# Patient Record
Sex: MALE | Race: WHITE | Employment: OTHER | URBAN - METROPOLITAN AREA
[De-identification: names, ages, dates, MRNs, and addresses within clinical notes are randomized per-mention and may not be internally consistent; named-entity substitution may affect disease eponyms.]

---

## 2017-01-04 ENCOUNTER — TELEPHONE (OUTPATIENT)
Dept: INTERNAL MEDICINE | Age: 67
End: 2017-01-04

## 2017-01-04 DIAGNOSIS — R19.5 HEME POSITIVE STOOL: Primary | ICD-10-CM

## 2017-01-05 ENCOUNTER — TELEPHONE (OUTPATIENT)
Dept: INTERNAL MEDICINE | Age: 67
End: 2017-01-05

## 2017-01-05 DIAGNOSIS — Z12.11 ENCOUNTER FOR SCREENING COLONOSCOPY: Primary | ICD-10-CM

## 2017-01-17 ENCOUNTER — TELEPHONE (OUTPATIENT)
Dept: INTERNAL MEDICINE | Age: 67
End: 2017-01-17

## 2017-01-17 DIAGNOSIS — J30.89 PERENNIAL ALLERGIC RHINITIS, UNSPECIFIED ALLERGIC RHINITIS TRIGGER: Chronic | ICD-10-CM

## 2017-01-17 RX ORDER — MONTELUKAST SODIUM 10 MG/1
10 TABLET ORAL NIGHTLY
Qty: 90 TABLET | Refills: 2 | Status: SHIPPED | OUTPATIENT
Start: 2017-01-17 | End: 2017-10-03 | Stop reason: SDUPTHER

## 2017-01-24 PROBLEM — Z83.2: Status: RESOLVED | Noted: 2017-01-24 | Resolved: 2017-01-24

## 2017-01-24 PROBLEM — Z83.2: Status: ACTIVE | Noted: 2017-01-24

## 2017-01-24 PROBLEM — D75.1 POLYCYTHEMIA: Status: ACTIVE | Noted: 2017-01-24

## 2017-02-13 ENCOUNTER — HOSPITAL ENCOUNTER (OUTPATIENT)
Dept: ENDOSCOPY | Age: 67
Discharge: HOME | End: 2017-02-13
Attending: INTERNAL MEDICINE | Admitting: INTERNAL MEDICINE

## 2017-02-13 VITALS
SYSTOLIC BLOOD PRESSURE: 183 MMHG | HEIGHT: 70 IN | RESPIRATION RATE: 16 BRPM | WEIGHT: 243 LBS | HEART RATE: 92 BPM | BODY MASS INDEX: 34.79 KG/M2 | DIASTOLIC BLOOD PRESSURE: 101 MMHG | TEMPERATURE: 98 F | OXYGEN SATURATION: 98 %

## 2017-02-13 RX ORDER — SODIUM CHLORIDE 0.9 % (FLUSH) 0.9 %
10 SYRINGE (ML) INJECTION PRN
Status: DISCONTINUED | OUTPATIENT
Start: 2017-02-13 | End: 2017-02-14 | Stop reason: HOSPADM

## 2017-02-13 RX ORDER — SODIUM CHLORIDE, SODIUM LACTATE, POTASSIUM CHLORIDE, CALCIUM CHLORIDE 600; 310; 30; 20 MG/100ML; MG/100ML; MG/100ML; MG/100ML
INJECTION, SOLUTION INTRAVENOUS CONTINUOUS
Status: DISCONTINUED | OUTPATIENT
Start: 2017-02-13 | End: 2017-02-14 | Stop reason: HOSPADM

## 2017-02-13 RX ORDER — SODIUM CHLORIDE 0.9 % (FLUSH) 0.9 %
10 SYRINGE (ML) INJECTION EVERY 12 HOURS SCHEDULED
Status: DISCONTINUED | OUTPATIENT
Start: 2017-02-13 | End: 2017-02-14 | Stop reason: HOSPADM

## 2017-02-13 ASSESSMENT — ENCOUNTER SYMPTOMS: SHORTNESS OF BREATH: 1

## 2017-03-31 DIAGNOSIS — E34.9 TESTOSTERONE DEFICIENCY: Chronic | ICD-10-CM

## 2017-04-05 RX ORDER — TESTOSTERONE CYPIONATE 200 MG/ML
INJECTION INTRAMUSCULAR
Qty: 10 ML | Refills: 1 | OUTPATIENT
Start: 2017-04-05 | End: 2017-10-23 | Stop reason: SDUPTHER

## 2017-04-17 ENCOUNTER — OFFICE VISIT (OUTPATIENT)
Dept: DERMATOLOGY | Age: 67
End: 2017-04-17

## 2017-04-17 DIAGNOSIS — L57.0 AK (ACTINIC KERATOSIS): Primary | ICD-10-CM

## 2017-04-17 DIAGNOSIS — L82.1 SK (SEBORRHEIC KERATOSIS): ICD-10-CM

## 2017-04-17 PROCEDURE — 4040F PNEUMOC VAC/ADMIN/RCVD: CPT | Performed by: DERMATOLOGY

## 2017-04-17 PROCEDURE — 1123F ACP DISCUSS/DSCN MKR DOCD: CPT | Performed by: DERMATOLOGY

## 2017-04-17 PROCEDURE — 3017F COLORECTAL CA SCREEN DOC REV: CPT | Performed by: DERMATOLOGY

## 2017-04-17 PROCEDURE — G8427 DOCREV CUR MEDS BY ELIG CLIN: HCPCS | Performed by: DERMATOLOGY

## 2017-04-17 PROCEDURE — G8417 CALC BMI ABV UP PARAM F/U: HCPCS | Performed by: DERMATOLOGY

## 2017-04-17 PROCEDURE — 99213 OFFICE O/P EST LOW 20 MIN: CPT | Performed by: DERMATOLOGY

## 2017-04-17 PROCEDURE — 1036F TOBACCO NON-USER: CPT | Performed by: DERMATOLOGY

## 2017-04-17 PROCEDURE — G8598 ASA/ANTIPLAT THER USED: HCPCS | Performed by: DERMATOLOGY

## 2017-06-22 ENCOUNTER — OFFICE VISIT (OUTPATIENT)
Dept: INTERNAL MEDICINE | Age: 67
End: 2017-06-22

## 2017-06-22 VITALS
HEART RATE: 76 BPM | SYSTOLIC BLOOD PRESSURE: 140 MMHG | BODY MASS INDEX: 35.22 KG/M2 | OXYGEN SATURATION: 99 % | DIASTOLIC BLOOD PRESSURE: 78 MMHG | HEIGHT: 70 IN | WEIGHT: 246 LBS

## 2017-06-22 DIAGNOSIS — E55.9 VITAMIN D DEFICIENCY: ICD-10-CM

## 2017-06-22 DIAGNOSIS — I51.7 LEFT VENTRICULAR HYPERTROPHY: Chronic | ICD-10-CM

## 2017-06-22 DIAGNOSIS — M54.16 LUMBAR RADICULOPATHY: Chronic | ICD-10-CM

## 2017-06-22 DIAGNOSIS — G89.29 CHRONIC LEFT-SIDED LOW BACK PAIN WITH LEFT-SIDED SCIATICA: Chronic | ICD-10-CM

## 2017-06-22 DIAGNOSIS — I10 ESSENTIAL HYPERTENSION: Chronic | ICD-10-CM

## 2017-06-22 DIAGNOSIS — Z12.5 SPECIAL SCREENING FOR MALIGNANT NEOPLASM OF PROSTATE: ICD-10-CM

## 2017-06-22 DIAGNOSIS — K21.9 GASTROESOPHAGEAL REFLUX DISEASE WITHOUT ESOPHAGITIS: Chronic | ICD-10-CM

## 2017-06-22 DIAGNOSIS — M54.42 CHRONIC LEFT-SIDED LOW BACK PAIN WITH LEFT-SIDED SCIATICA: Chronic | ICD-10-CM

## 2017-06-22 DIAGNOSIS — D75.1 POLYCYTHEMIA: Primary | ICD-10-CM

## 2017-06-22 DIAGNOSIS — M47.816 LUMBAR FACET ARTHROPATHY: Chronic | ICD-10-CM

## 2017-06-22 DIAGNOSIS — M48.061 LUMBAR SPINAL STENOSIS: Chronic | ICD-10-CM

## 2017-06-22 DIAGNOSIS — Z01.818 PREOPERATIVE EXAMINATION: ICD-10-CM

## 2017-06-22 DIAGNOSIS — M51.9 LUMBAR DISC DISEASE: Chronic | ICD-10-CM

## 2017-06-22 DIAGNOSIS — E78.5 HYPERLIPIDEMIA, UNSPECIFIED HYPERLIPIDEMIA TYPE: Chronic | ICD-10-CM

## 2017-06-22 DIAGNOSIS — M47.26 OSTEOARTHRITIS OF SPINE WITH RADICULOPATHY, LUMBAR REGION: Chronic | ICD-10-CM

## 2017-06-22 DIAGNOSIS — E34.9 TESTOSTERONE DEFICIENCY: Chronic | ICD-10-CM

## 2017-06-22 DIAGNOSIS — R73.9 HYPERGLYCEMIA: Chronic | ICD-10-CM

## 2017-06-22 LAB
ALBUMIN SERPL-MCNC: 4.4 G/DL (ref 3.4–5)
ALP BLD-CCNC: 69 U/L (ref 40–129)
ALT SERPL-CCNC: 27 U/L (ref 10–40)
ANION GAP SERPL CALCULATED.3IONS-SCNC: 14 MMOL/L (ref 3–16)
AST SERPL-CCNC: 25 U/L (ref 15–37)
BASOPHILS ABSOLUTE: 0.1 K/UL (ref 0–0.2)
BASOPHILS RELATIVE PERCENT: 1.1 %
BILIRUB SERPL-MCNC: 0.4 MG/DL (ref 0–1)
BILIRUBIN DIRECT: <0.2 MG/DL (ref 0–0.3)
BILIRUBIN, INDIRECT: NORMAL MG/DL (ref 0–1)
BUN BLDV-MCNC: 11 MG/DL (ref 7–20)
CALCIUM SERPL-MCNC: 9.4 MG/DL (ref 8.3–10.6)
CHLORIDE BLD-SCNC: 97 MMOL/L (ref 99–110)
CO2: 27 MMOL/L (ref 21–32)
CREAT SERPL-MCNC: 0.9 MG/DL (ref 0.8–1.3)
EOSINOPHILS ABSOLUTE: 0.4 K/UL (ref 0–0.6)
EOSINOPHILS RELATIVE PERCENT: 4.2 %
GFR AFRICAN AMERICAN: >60
GFR NON-AFRICAN AMERICAN: >60
GLUCOSE BLD-MCNC: 72 MG/DL (ref 70–99)
HCT VFR BLD CALC: 37.2 % (ref 40.5–52.5)
HEMOGLOBIN: 10.9 G/DL (ref 13.5–17.5)
LYMPHOCYTES ABSOLUTE: 1.8 K/UL (ref 1–5.1)
LYMPHOCYTES RELATIVE PERCENT: 21.3 %
MCH RBC QN AUTO: 22.2 PG (ref 26–34)
MCHC RBC AUTO-ENTMCNC: 29.4 G/DL (ref 31–36)
MCV RBC AUTO: 75.6 FL (ref 80–100)
MONOCYTES ABSOLUTE: 0.9 K/UL (ref 0–1.3)
MONOCYTES RELATIVE PERCENT: 11.2 %
NEUTROPHILS ABSOLUTE: 5.2 K/UL (ref 1.7–7.7)
NEUTROPHILS RELATIVE PERCENT: 62.2 %
PDW BLD-RTO: 17.6 % (ref 12.4–15.4)
PHOSPHORUS: 3.1 MG/DL (ref 2.5–4.9)
PLATELET # BLD: 256 K/UL (ref 135–450)
PMV BLD AUTO: 8.9 FL (ref 5–10.5)
POTASSIUM SERPL-SCNC: 4.8 MMOL/L (ref 3.5–5.1)
PROSTATE SPECIFIC ANTIGEN: 0.73 NG/ML (ref 0–4)
RBC # BLD: 4.91 M/UL (ref 4.2–5.9)
SODIUM BLD-SCNC: 138 MMOL/L (ref 136–145)
TOTAL PROTEIN: 7 G/DL (ref 6.4–8.2)
VITAMIN D 25-HYDROXY: 29 NG/ML
WBC # BLD: 8.4 K/UL (ref 4–11)

## 2017-06-22 PROCEDURE — 1123F ACP DISCUSS/DSCN MKR DOCD: CPT | Performed by: INTERNAL MEDICINE

## 2017-06-22 PROCEDURE — 1036F TOBACCO NON-USER: CPT | Performed by: INTERNAL MEDICINE

## 2017-06-22 PROCEDURE — 3017F COLORECTAL CA SCREEN DOC REV: CPT | Performed by: INTERNAL MEDICINE

## 2017-06-22 PROCEDURE — G8598 ASA/ANTIPLAT THER USED: HCPCS | Performed by: INTERNAL MEDICINE

## 2017-06-22 PROCEDURE — 93000 ELECTROCARDIOGRAM COMPLETE: CPT | Performed by: INTERNAL MEDICINE

## 2017-06-22 PROCEDURE — 99215 OFFICE O/P EST HI 40 MIN: CPT | Performed by: INTERNAL MEDICINE

## 2017-06-22 PROCEDURE — G8427 DOCREV CUR MEDS BY ELIG CLIN: HCPCS | Performed by: INTERNAL MEDICINE

## 2017-06-22 PROCEDURE — 4040F PNEUMOC VAC/ADMIN/RCVD: CPT | Performed by: INTERNAL MEDICINE

## 2017-06-22 PROCEDURE — G8417 CALC BMI ABV UP PARAM F/U: HCPCS | Performed by: INTERNAL MEDICINE

## 2017-06-22 RX ORDER — GABAPENTIN 300 MG/1
300 CAPSULE ORAL NIGHTLY
COMMUNITY
End: 2017-10-23 | Stop reason: SDUPTHER

## 2017-06-22 RX ORDER — DICLOFENAC SODIUM 75 MG/1
75 TABLET, DELAYED RELEASE ORAL 2 TIMES DAILY
COMMUNITY
End: 2018-05-02

## 2017-06-22 RX ORDER — DIAZEPAM 10 MG/1
TABLET ORAL
Qty: 2 TABLET | Refills: 0 | Status: SHIPPED | OUTPATIENT
Start: 2017-06-22 | End: 2019-11-04

## 2017-06-22 ASSESSMENT — ENCOUNTER SYMPTOMS
VOMITING: 0
CONSTIPATION: 0
ABDOMINAL PAIN: 0
DIARRHEA: 0
RECTAL PAIN: 0
ANAL BLEEDING: 0
BACK PAIN: 1
ABDOMINAL DISTENTION: 0
BLOOD IN STOOL: 0
RESPIRATORY NEGATIVE: 1
EYES NEGATIVE: 1
NAUSEA: 0

## 2017-06-23 LAB — TESTOSTERONE TOTAL-MALE: 629 NG/DL (ref 300–720)

## 2017-06-25 ASSESSMENT — ENCOUNTER SYMPTOMS: BLURRED VISION: 0

## 2017-07-03 ENCOUNTER — TELEPHONE (OUTPATIENT)
Dept: INTERNAL MEDICINE | Age: 67
End: 2017-07-03

## 2017-10-03 ENCOUNTER — TELEPHONE (OUTPATIENT)
Dept: INTERNAL MEDICINE | Age: 67
End: 2017-10-03

## 2017-10-03 RX ORDER — MONTELUKAST SODIUM 10 MG/1
10 TABLET ORAL NIGHTLY
Qty: 90 TABLET | Refills: 0 | Status: SHIPPED | OUTPATIENT
Start: 2017-10-03 | End: 2017-10-23 | Stop reason: SDUPTHER

## 2017-10-03 NOTE — TELEPHONE ENCOUNTER
Message given to [facility].  Pt will call pt to see if they want to schedule with another physician

## 2017-10-04 DIAGNOSIS — K21.9 GASTROESOPHAGEAL REFLUX DISEASE WITHOUT ESOPHAGITIS: Chronic | ICD-10-CM

## 2017-10-04 DIAGNOSIS — E34.9 TESTOSTERONE DEFICIENCY: Chronic | ICD-10-CM

## 2017-10-04 DIAGNOSIS — I10 ESSENTIAL HYPERTENSION: Chronic | ICD-10-CM

## 2017-10-04 RX ORDER — FLUTICASONE FUROATE AND VILANTEROL 100; 25 UG/1; UG/1
1 POWDER RESPIRATORY (INHALATION) DAILY
Qty: 30 EACH | Refills: 2 | Status: SHIPPED | OUTPATIENT
Start: 2017-10-04

## 2017-10-04 RX ORDER — SYRINGE W-NEEDLE,DISPOSAB,3 ML 25GX5/8"
SYRINGE, EMPTY DISPOSABLE MISCELLANEOUS
Qty: 100 EACH | Refills: 0 | Status: SHIPPED | OUTPATIENT
Start: 2017-10-04

## 2017-10-04 RX ORDER — OMEPRAZOLE 40 MG/1
40 CAPSULE, DELAYED RELEASE ORAL 2 TIMES DAILY
Qty: 180 CAPSULE | Refills: 0 | Status: SHIPPED | OUTPATIENT
Start: 2017-10-04 | End: 2017-10-23 | Stop reason: SDUPTHER

## 2017-10-04 RX ORDER — IRBESARTAN 75 MG/1
75 TABLET ORAL DAILY
Qty: 90 TABLET | Refills: 0 | Status: SHIPPED | OUTPATIENT
Start: 2017-10-04 | End: 2017-10-23 | Stop reason: SDUPTHER

## 2017-10-04 RX ORDER — CARVEDILOL 12.5 MG/1
12.5 TABLET ORAL 2 TIMES DAILY
Qty: 180 TABLET | Refills: 0 | Status: SHIPPED | OUTPATIENT
Start: 2017-10-04 | End: 2017-10-23 | Stop reason: SDUPTHER

## 2017-10-06 ENCOUNTER — TELEPHONE (OUTPATIENT)
Dept: INTERNAL MEDICINE | Age: 67
End: 2017-10-06

## 2017-10-19 ENCOUNTER — OFFICE VISIT (OUTPATIENT)
Dept: DERMATOLOGY | Age: 67
End: 2017-10-19

## 2017-10-19 DIAGNOSIS — L82.0 INFLAMED SEBORRHEIC KERATOSIS: ICD-10-CM

## 2017-10-19 DIAGNOSIS — L57.0 AK (ACTINIC KERATOSIS): Primary | ICD-10-CM

## 2017-10-19 DIAGNOSIS — D48.5 NEOPLASM OF UNCERTAIN BEHAVIOR OF SKIN: ICD-10-CM

## 2017-10-19 PROCEDURE — 17000 DESTRUCT PREMALG LESION: CPT | Performed by: DERMATOLOGY

## 2017-10-19 PROCEDURE — 11100 PR BIOPSY OF SKIN LESION: CPT | Performed by: DERMATOLOGY

## 2017-10-19 PROCEDURE — 1036F TOBACCO NON-USER: CPT | Performed by: DERMATOLOGY

## 2017-10-19 PROCEDURE — 17003 DESTRUCT PREMALG LES 2-14: CPT | Performed by: DERMATOLOGY

## 2017-10-19 PROCEDURE — 17110 DESTRUCTION B9 LES UP TO 14: CPT | Performed by: DERMATOLOGY

## 2017-10-19 NOTE — PROGRESS NOTES
[facility]  Caitie Lynn MD  [phone]      Marianne Pacer  1950    79 y.o. male     Date of Visit: 10/19/2017    Chief Complaint: AKs    History of Present Illness:    1. F/u for a history of AKs - complains of only few scaly lesions on the scalp and face today. 2.  He complains of intensely pruritic lesions on the flanks, right shoulder and abdomen. 3.  Unknown duration of a pigmented lesion on the left shoulder. Review of Systems:  Skin: No new or changing moles. Past Medical History, Family History, Surgical History, Medications and Allergies reviewed.     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    Dyspnea on exertion 11/8/2010    Erectile dysfunction 11/8/2010    Essential hypertension 11/4/2015    Fatty liver 11/8/2010    Gastroesophageal reflux disease with esophagitis 5/27/2016    Gastroesophageal reflux disease without esophagitis 11/30/2015    GERD (gastroesophageal reflux disease) 11/8/2010    Heme positive stool 11/8/2010    Hyperglycemia 11/8/2010    Hyperlipidemia 11/8/2010    Hypertension 11/8/2010    Left ventricular hypertrophy 11/8/2010    Lumbar disc disease 1/3/2013    Lumbar disc herniation 11/8/2010    Lumbar facet arthropathy 1/3/2013    Lumbar radiculopathy 11/8/2010    Lumbar spinal stenosis 1/3/2013    Obesity 11/8/2010    Perennial allergic rhinitis 10/17/2016    Polycythemia     Retinal detachment 9/30/2013    Right eye - 2013    Testosterone deficiency 3/7/2013     Past Surgical History:   Procedure Laterality Date    BACK SURGERY  07/01/2017    CATARACT REMOVAL WITH IMPLANT Left November 11, surgical pen. Alcohol was used to cleanse the site. Local anesthesia was acheived with 1% lidocaine with epinephrine. Shave biopsy was performed using a razor blade. Hemostasis was achieved with aluminum chloride. The wound(s) were dressed with petrolatum and covered with a bandage. Wound care instructions were reviewed. 1 Specimen (s) sent to pathology. The specimen bottles were appropriately labeled. We also reviewed the risks of bleeding, scar, and infection. Return in about 6 months (around 4/19/2018).

## 2017-10-23 ENCOUNTER — OFFICE VISIT (OUTPATIENT)
Dept: INTERNAL MEDICINE | Age: 67
End: 2017-10-23

## 2017-10-23 VITALS
BODY MASS INDEX: 34.79 KG/M2 | HEIGHT: 70 IN | OXYGEN SATURATION: 98 % | DIASTOLIC BLOOD PRESSURE: 84 MMHG | SYSTOLIC BLOOD PRESSURE: 132 MMHG | WEIGHT: 243 LBS | HEART RATE: 74 BPM

## 2017-10-23 DIAGNOSIS — K21.9 GASTROESOPHAGEAL REFLUX DISEASE WITHOUT ESOPHAGITIS: Chronic | ICD-10-CM

## 2017-10-23 DIAGNOSIS — R05.9 COUGH: ICD-10-CM

## 2017-10-23 DIAGNOSIS — E34.9 TESTOSTERONE DEFICIENCY: Chronic | ICD-10-CM

## 2017-10-23 DIAGNOSIS — R06.09 EXERTIONAL DYSPNEA: Primary | ICD-10-CM

## 2017-10-23 DIAGNOSIS — I10 ESSENTIAL HYPERTENSION: Chronic | ICD-10-CM

## 2017-10-23 DIAGNOSIS — K76.0 FATTY LIVER: Chronic | ICD-10-CM

## 2017-10-23 DIAGNOSIS — E78.5 HYPERLIPIDEMIA, UNSPECIFIED HYPERLIPIDEMIA TYPE: Chronic | ICD-10-CM

## 2017-10-23 DIAGNOSIS — M54.50 LOW BACK PAIN WITHOUT SCIATICA, UNSPECIFIED BACK PAIN LATERALITY, UNSPECIFIED CHRONICITY: ICD-10-CM

## 2017-10-23 PROCEDURE — G8484 FLU IMMUNIZE NO ADMIN: HCPCS | Performed by: INTERNAL MEDICINE

## 2017-10-23 PROCEDURE — 1123F ACP DISCUSS/DSCN MKR DOCD: CPT | Performed by: INTERNAL MEDICINE

## 2017-10-23 PROCEDURE — G8417 CALC BMI ABV UP PARAM F/U: HCPCS | Performed by: INTERNAL MEDICINE

## 2017-10-23 PROCEDURE — 1036F TOBACCO NON-USER: CPT | Performed by: INTERNAL MEDICINE

## 2017-10-23 PROCEDURE — 99214 OFFICE O/P EST MOD 30 MIN: CPT | Performed by: INTERNAL MEDICINE

## 2017-10-23 PROCEDURE — 4040F PNEUMOC VAC/ADMIN/RCVD: CPT | Performed by: INTERNAL MEDICINE

## 2017-10-23 PROCEDURE — G8598 ASA/ANTIPLAT THER USED: HCPCS | Performed by: INTERNAL MEDICINE

## 2017-10-23 PROCEDURE — 3017F COLORECTAL CA SCREEN DOC REV: CPT | Performed by: INTERNAL MEDICINE

## 2017-10-23 PROCEDURE — G8427 DOCREV CUR MEDS BY ELIG CLIN: HCPCS | Performed by: INTERNAL MEDICINE

## 2017-10-23 RX ORDER — CARVEDILOL 12.5 MG/1
12.5 TABLET ORAL 2 TIMES DAILY
Qty: 180 TABLET | Refills: 3 | Status: SHIPPED | OUTPATIENT
Start: 2017-10-23 | End: 2019-01-03 | Stop reason: SDUPTHER

## 2017-10-23 RX ORDER — METHOCARBAMOL 750 MG/1
750 TABLET, FILM COATED ORAL 2 TIMES DAILY
Qty: 60 TABLET | Refills: 2 | Status: SHIPPED | OUTPATIENT
Start: 2017-10-23 | End: 2018-06-14 | Stop reason: SDUPTHER

## 2017-10-23 RX ORDER — GABAPENTIN 300 MG/1
300 CAPSULE ORAL NIGHTLY
Qty: 90 CAPSULE | Refills: 3 | Status: SHIPPED | OUTPATIENT
Start: 2017-10-23 | End: 2018-05-02 | Stop reason: SDUPTHER

## 2017-10-23 RX ORDER — IRBESARTAN 75 MG/1
75 TABLET ORAL DAILY
Qty: 90 TABLET | Refills: 3 | Status: SHIPPED | OUTPATIENT
Start: 2017-10-23 | End: 2018-05-02 | Stop reason: SDUPTHER

## 2017-10-23 RX ORDER — OMEPRAZOLE 40 MG/1
40 CAPSULE, DELAYED RELEASE ORAL 2 TIMES DAILY
Qty: 180 CAPSULE | Refills: 3 | Status: SHIPPED | OUTPATIENT
Start: 2017-10-23

## 2017-10-23 RX ORDER — TESTOSTERONE CYPIONATE 200 MG/ML
INJECTION INTRAMUSCULAR
Qty: 6 ML | Refills: 5 | Status: SHIPPED | OUTPATIENT
Start: 2017-10-23 | End: 2018-06-14 | Stop reason: SDUPTHER

## 2017-10-23 RX ORDER — MONTELUKAST SODIUM 10 MG/1
10 TABLET ORAL NIGHTLY
Qty: 90 TABLET | Refills: 3 | Status: SHIPPED | OUTPATIENT
Start: 2017-10-23 | End: 2018-12-28 | Stop reason: SDUPTHER

## 2017-10-23 ASSESSMENT — ENCOUNTER SYMPTOMS
CHEST TIGHTNESS: 0
ABDOMINAL PAIN: 1
SORE THROAT: 0
NAUSEA: 0
BACK PAIN: 1
SHORTNESS OF BREATH: 1
VOMITING: 0

## 2017-10-23 NOTE — PROGRESS NOTES
Outpatient Note for established Patient - regular Visit    History Obtained From:  patient, electronic medical record    CHIEF COMPLAINT:    Chief Complaint   Patient presents with    Hyperlipidemia     follow up     Hypertension     follow up     Cough     slightly productive        HISTORY OF PRESENT ILLNESS:   The patient is a 79 y.o. male who is here today for f/u after back surgery 2009, 2013, 2017 with herniated disc in last time. In the last few weeks he has worsening in his chronic cough (that was diagnosed as GERD related). He treated it with Mucinex. It is worse at nights. also exertional dyspnea- EKG from 06/2017. Has has the diagnosis of asthma. He has no history of smoking. No history of loss of weight or loss of appetite. He also had angiogram in [id number]- for exertional dyspnea. no abnormality found.    He is also here for follow-up with hypertension and hyperlipidemia    Past Medical History:        Diagnosis Date    Allergic rhinitis 6/26/2012    Anxiety 11/8/2010    Cataracts, bilateral 11/8/2010    Cervical radiculopathy 11/8/2010    Chronic left-sided low back pain with left-sided sciatica 6/22/2017    Decreased hearing 11/8/2010    Detached retina 11/8/2010    DJD (degenerative joint disease) of lumbar spine 11/8/2010    Dyspnea on exertion 11/8/2010    Erectile dysfunction 11/8/2010    Essential hypertension 11/4/2015    Fatty liver 11/8/2010    Gastroesophageal reflux disease with esophagitis 5/27/2016    Gastroesophageal reflux disease without esophagitis 11/30/2015    GERD (gastroesophageal reflux disease) 11/8/2010    Heme positive stool 11/8/2010    Hyperglycemia 11/8/2010    Hyperlipidemia 11/8/2010    Hypertension 11/8/2010    Left ventricular hypertrophy 11/8/2010    Lumbar disc disease 1/3/2013    Lumbar disc herniation 11/8/2010    Lumbar facet arthropathy 1/3/2013    Lumbar radiculopathy 11/8/2010    Lumbar spinal stenosis 1/3/2013    Obesity 11/8/2010  Perennial allergic rhinitis 10/17/2016    Polycythemia     Retinal detachment 9/30/2013    Right eye - 2013    Testosterone deficiency 3/7/2013       Social History:   TOBACCO:   reports that he has never smoked. He has never used smokeless tobacco.  ETOH:   reports that he drinks alcohol. Current Medications:    Prior to Admission medications    Medication Sig Start Date End Date Taking?  Authorizing Provider   testosterone cypionate (DEPOTESTOTERONE CYPIONATE) 200 MG/ML injection Administer 1 ml (200 mg) intramuscularly every two weeks dispense multi-dose 10/23/17  Yes Chetna Maldonado MD   irbesartan (AVAPRO) 75 MG tablet Take 1 tablet by mouth daily 10/23/17  Yes Chetna Maldonado MD   montelukast (SINGULAIR) 10 MG tablet Take 1 tablet by mouth nightly 10/23/17  Yes Chetna Maldonado MD   carvedilol (COREG) 12.5 MG tablet Take 1 tablet by mouth 2 times daily 10/23/17  Yes Chetna Maldonado MD   omeprazole (PRILOSEC) 40 MG delayed release capsule Take 1 capsule by mouth 2 times daily 10/23/17  Yes Chetna Maldonado MD   gabapentin (NEURONTIN) 300 MG capsule Take 1 capsule by mouth nightly 10/23/17  Yes Chetna Maldonado MD   methocarbamol (ROBAXIN-750) 750 MG tablet Take 1 tablet by mouth 2 times daily 10/23/17  Yes Chetna Maldonado MD   fluticasone-vilanterol (BREO ELLIPTA) 100-25 MCG/INH AEPB inhaler Inhale 1 puff into the lungs daily 10/4/17  Yes Armani Arora MD   beclomethasone (QNASL) 80 MCG/ACT AERS nasal spray 1 spray by Each Nare route daily 10/4/17  Yes Armani Aroar MD   Hypodermic Needles-Disposable [facility] NEEDLE 18G) MISC Use once a month to administer testosterone 10/4/17  Yes Armani Arora MD   Syringe/Needle, Disp, (SYRINGE 3CC/22GX1\") 22G X 1\" 3 ML MISC Use once a month to administer testosterone 10/4/17  Yes Armani Arora MD   diclofenac (VOLTAREN) 75 MG EC tablet Take 75 mg by mouth 2 times daily   Yes Historical Provider, MD   diazepam (VALIUM) 10 MG tablet Use as directed -- take one the night before surgery and one on the morning of surgery 6/22/17  Yes Deja Pearce MD   docusate sodium (COLACE) 100 MG capsule Take 100 mg by mouth 2 times daily   Yes Historical Provider, MD   aspirin EC 81 MG EC tablet Take 1 tablet by mouth daily with food. 5/27/16  Yes Deja Pearce MD   Multiple Vitamin (MULTIVITAMIN) capsule Take 1 capsule by mouth daily 11/30/15  Yes Deja Pearce MD   cycloSPORINE (RESTASIS) 0.05 % ophthalmic emulsion Place 1 drop into both eyes 2 times daily. 9/30/13  Yes Deja Pearce MD       REVIEW OF SYSTEMS:  Review of Systems   Constitutional: Negative for chills and fever. HENT: Negative for ear pain and sore throat. Respiratory: Positive for shortness of breath. Negative for chest tightness. Cardiovascular: Negative for chest pain and palpitations. No PND/orthopnea/ nocurria    Gastrointestinal: Positive for abdominal pain. Negative for nausea and vomiting. He does get epigastric pain and belching. Musculoskeletal: Positive for back pain. Neurological: Negative for weakness and numbness. Physical Exam:      Vitals: /84   Pulse 74   Ht 5' 10\" (1.778 m)   Wt 243 lb (110.2 kg)   SpO2 98%   BMI 34.87 kg/m²     Body mass index is 34.87 kg/m². Wt Readings from Last 3 Encounters:   10/23/17 243 lb (110.2 kg)   06/22/17 246 lb (111.6 kg)   05/16/17 244 lb 6.4 oz (110.9 kg)     Physical Exam   Constitutional: He is oriented to person, place, and time. He appears well-developed and well-nourished. No distress. HENT:   Head: Normocephalic and atraumatic. Right Ear: External ear normal.   Left Ear: External ear normal.   Mouth/Throat: Oropharynx is clear and moist. No oropharyngeal exudate. Eyes: Conjunctivae and EOM are normal. Right eye exhibits no discharge. Left eye exhibits no discharge. No scleral icterus. Neck: Normal range of motion. Neck supple.    Cardiovascular: Normal rate, normal heart

## 2017-10-24 ENCOUNTER — TELEPHONE (OUTPATIENT)
Dept: DERMATOLOGY | Age: 67
End: 2017-10-24

## 2017-10-25 ENCOUNTER — TELEPHONE (OUTPATIENT)
Dept: INTERNAL MEDICINE | Age: 67
End: 2017-10-25

## 2017-10-25 DIAGNOSIS — R06.09 EXERTIONAL DYSPNEA: Primary | ICD-10-CM

## 2017-10-25 NOTE — TELEPHONE ENCOUNTER
Reviewed results of the biopsy with the patient. Date of biopsy: 10/19/17  Site of biopsy: L shoulder  Result: Dermatofibroma    Plan: Benign, no further treatment needed    The patient expressed understanding of the plan.

## 2017-10-26 ENCOUNTER — TELEPHONE (OUTPATIENT)
Dept: INTERNAL MEDICINE | Age: 67
End: 2017-10-26

## 2017-10-26 NOTE — TELEPHONE ENCOUNTER
Pt calling back and wanted to be informed if medical records where faxed from the Pulmonologist Dr. Km Coello and would like a call to be advised   Pt can be reached at [phone].  Pt states to please leave a message if we received or not

## 2017-10-27 ENCOUNTER — TELEPHONE (OUTPATIENT)
Dept: INTERNAL MEDICINE | Age: 67
End: 2017-10-27

## 2017-11-01 ENCOUNTER — LAB (OUTPATIENT)
Dept: LAB | Facility: HOSPITAL | Age: 67
End: 2017-11-01

## 2017-11-01 ENCOUNTER — TRANSCRIBE ORDERS (OUTPATIENT)
Dept: LAB | Facility: HOSPITAL | Age: 67
End: 2017-11-01

## 2017-11-01 DIAGNOSIS — E78.5 HYPERLIPIDEMIA, UNSPECIFIED HYPERLIPIDEMIA TYPE: ICD-10-CM

## 2017-11-01 DIAGNOSIS — I10 ESSENTIAL HYPERTENSION, MALIGNANT: ICD-10-CM

## 2017-11-01 DIAGNOSIS — E66.8 OBESITY OF ENDOCRINE ORIGIN: Primary | ICD-10-CM

## 2017-11-01 DIAGNOSIS — E66.8 OBESITY OF ENDOCRINE ORIGIN: ICD-10-CM

## 2017-11-01 LAB
ALBUMIN SERPL-MCNC: 4.6 G/DL (ref 3.2–4.8)
ALBUMIN/GLOB SERPL: 1.8 G/DL (ref 1.5–2.5)
ALP SERPL-CCNC: 78 U/L (ref 25–100)
ALT SERPL W P-5'-P-CCNC: 31 U/L (ref 7–40)
ANION GAP SERPL CALCULATED.3IONS-SCNC: 6 MMOL/L (ref 3–11)
ARTICHOKE IGE QN: 106 MG/DL (ref 0–130)
AST SERPL-CCNC: 27 U/L (ref 0–33)
BILIRUB SERPL-MCNC: 0.3 MG/DL (ref 0.3–1.2)
BUN BLD-MCNC: 12 MG/DL (ref 9–23)
BUN/CREAT SERPL: 12 (ref 7–25)
CALCIUM SPEC-SCNC: 9.4 MG/DL (ref 8.7–10.4)
CHLORIDE SERPL-SCNC: 103 MMOL/L (ref 99–109)
CHOLEST SERPL-MCNC: 161 MG/DL (ref 0–200)
CO2 SERPL-SCNC: 31 MMOL/L (ref 20–31)
CREAT BLD-MCNC: 1 MG/DL (ref 0.6–1.3)
DEPRECATED RDW RBC AUTO: 48.2 FL (ref 37–54)
ERYTHROCYTE [DISTWIDTH] IN BLOOD BY AUTOMATED COUNT: 18.3 % (ref 11.3–14.5)
GFR SERPL CREATININE-BSD FRML MDRD: 75 ML/MIN/1.73
GLOBULIN UR ELPH-MCNC: 2.6 GM/DL
GLUCOSE BLD-MCNC: 104 MG/DL (ref 70–100)
HCT VFR BLD AUTO: 42.2 % (ref 38.9–50.9)
HDLC SERPL-MCNC: 54 MG/DL (ref 40–60)
HGB BLD-MCNC: 11.9 G/DL (ref 13.1–17.5)
MCH RBC QN AUTO: 20.6 PG (ref 27–31)
MCHC RBC AUTO-ENTMCNC: 28.2 G/DL (ref 32–36)
MCV RBC AUTO: 73 FL (ref 80–99)
PLATELET # BLD AUTO: 329 10*3/MM3 (ref 150–450)
PMV BLD AUTO: 9.7 FL (ref 6–12)
POTASSIUM BLD-SCNC: 4.8 MMOL/L (ref 3.5–5.5)
PROT SERPL-MCNC: 7.2 G/DL (ref 5.7–8.2)
RBC # BLD AUTO: 5.78 10*6/MM3 (ref 4.2–5.76)
SODIUM BLD-SCNC: 140 MMOL/L (ref 132–146)
TESTOST SERPL-MCNC: 438.06 NG/DL (ref 86.98–780.1)
TRIGL SERPL-MCNC: 109 MG/DL (ref 0–150)
WBC NRBC COR # BLD: 7.33 10*3/MM3 (ref 3.5–10.8)

## 2017-11-01 PROCEDURE — 84403 ASSAY OF TOTAL TESTOSTERONE: CPT | Performed by: INTERNAL MEDICINE

## 2017-11-01 PROCEDURE — 36415 COLL VENOUS BLD VENIPUNCTURE: CPT | Performed by: INTERNAL MEDICINE

## 2017-11-01 PROCEDURE — 80061 LIPID PANEL: CPT | Performed by: INTERNAL MEDICINE

## 2017-11-01 PROCEDURE — 85027 COMPLETE CBC AUTOMATED: CPT | Performed by: INTERNAL MEDICINE

## 2017-11-01 PROCEDURE — 80053 COMPREHEN METABOLIC PANEL: CPT | Performed by: INTERNAL MEDICINE

## 2017-11-14 ENCOUNTER — HOSPITAL ENCOUNTER (OUTPATIENT)
Dept: NON INVASIVE DIAGNOSTICS | Age: 67
Discharge: HOME | End: 2017-11-14
Attending: INTERNAL MEDICINE | Admitting: INTERNAL MEDICINE

## 2017-11-14 DIAGNOSIS — R06.00 DYSPNEA: ICD-10-CM

## 2017-11-14 DIAGNOSIS — R06.09 EXERTIONAL DYSPNEA: ICD-10-CM

## 2017-11-14 LAB
LV EF: 60 %
LVEF MODALITY: NORMAL

## 2017-12-11 ENCOUNTER — TELEPHONE (OUTPATIENT)
Dept: INTERNAL MEDICINE | Age: 67
End: 2017-12-11

## 2018-01-18 ENCOUNTER — TELEPHONE (OUTPATIENT)
Dept: INTERNAL MEDICINE | Age: 68
End: 2018-01-18

## 2018-01-18 DIAGNOSIS — R05.9 COUGH: Primary | ICD-10-CM

## 2018-01-22 NOTE — TELEPHONE ENCOUNTER
Pt states that his wife has been congested and has had productive cough that started while pt was sick. He is concerned that she may get the flu too and she is scheduled for surgery next week. This has been sent to MD to advise.

## 2018-01-24 ENCOUNTER — HOSPITAL ENCOUNTER (OUTPATIENT)
Dept: OTHER | Age: 68
Discharge: HOME | End: 2018-01-24
Attending: INTERNAL MEDICINE | Admitting: INTERNAL MEDICINE

## 2018-01-24 ENCOUNTER — OFFICE VISIT (OUTPATIENT)
Dept: INTERNAL MEDICINE | Age: 68
End: 2018-01-24

## 2018-01-24 VITALS
BODY MASS INDEX: 34.87 KG/M2 | TEMPERATURE: 98.1 F | HEART RATE: 88 BPM | SYSTOLIC BLOOD PRESSURE: 152 MMHG | DIASTOLIC BLOOD PRESSURE: 90 MMHG | WEIGHT: 243 LBS | OXYGEN SATURATION: 97 %

## 2018-01-24 DIAGNOSIS — J40 BRONCHITIS: Primary | ICD-10-CM

## 2018-01-24 DIAGNOSIS — R05.9 COUGH: ICD-10-CM

## 2018-01-24 PROCEDURE — 1036F TOBACCO NON-USER: CPT | Performed by: INTERNAL MEDICINE

## 2018-01-24 PROCEDURE — 1123F ACP DISCUSS/DSCN MKR DOCD: CPT | Performed by: INTERNAL MEDICINE

## 2018-01-24 PROCEDURE — G8484 FLU IMMUNIZE NO ADMIN: HCPCS | Performed by: INTERNAL MEDICINE

## 2018-01-24 PROCEDURE — 3017F COLORECTAL CA SCREEN DOC REV: CPT | Performed by: INTERNAL MEDICINE

## 2018-01-24 PROCEDURE — G8598 ASA/ANTIPLAT THER USED: HCPCS | Performed by: INTERNAL MEDICINE

## 2018-01-24 PROCEDURE — 4040F PNEUMOC VAC/ADMIN/RCVD: CPT | Performed by: INTERNAL MEDICINE

## 2018-01-24 PROCEDURE — G8427 DOCREV CUR MEDS BY ELIG CLIN: HCPCS | Performed by: INTERNAL MEDICINE

## 2018-01-24 PROCEDURE — G8417 CALC BMI ABV UP PARAM F/U: HCPCS | Performed by: INTERNAL MEDICINE

## 2018-01-24 PROCEDURE — 99213 OFFICE O/P EST LOW 20 MIN: CPT | Performed by: INTERNAL MEDICINE

## 2018-01-24 RX ORDER — AZITHROMYCIN 250 MG/1
TABLET, FILM COATED ORAL
Qty: 1 PACKET | Refills: 0 | Status: SHIPPED | OUTPATIENT
Start: 2018-01-24 | End: 2018-02-03

## 2018-01-24 NOTE — PROGRESS NOTES
Outpatient Note for established Patient - regular Visit    History Obtained From:  patient, electronic medical record  Is the patient new to me ? - No    HISTORY OF PRESENT ILLNESS:   The patient is a 79 y.o. male who is here today for :  1) he was having cough-m productive, SOB epigastric pain , some sore throat. He went to the urgency room--> tested + with flu. On Monday he thought that he was doing better. Today he has epigastric pain, cough is worse with sputum. No fever/ chills today. Occasional diarreha. Past Medical History:        Diagnosis Date    Allergic rhinitis 6/26/2012    Anxiety 11/8/2010    Cataracts, bilateral 11/8/2010    Cervical radiculopathy 11/8/2010    Chronic left-sided low back pain with left-sided sciatica 6/22/2017    Decreased hearing 11/8/2010    Detached retina 11/8/2010    DJD (degenerative joint disease) of lumbar spine 11/8/2010    Dyspnea on exertion 11/8/2010    Erectile dysfunction 11/8/2010    Essential hypertension 11/4/2015    Fatty liver 11/8/2010    Gastroesophageal reflux disease with esophagitis 5/27/2016    Gastroesophageal reflux disease without esophagitis 11/30/2015    GERD (gastroesophageal reflux disease) 11/8/2010    Heme positive stool 11/8/2010    Hyperglycemia 11/8/2010    Hyperlipidemia 11/8/2010    Hypertension 11/8/2010    Left ventricular hypertrophy 11/8/2010    Lumbar disc disease 1/3/2013    Lumbar disc herniation 11/8/2010    Lumbar facet arthropathy 1/3/2013    Lumbar radiculopathy 11/8/2010    Lumbar spinal stenosis 1/3/2013    Obesity 11/8/2010    Perennial allergic rhinitis 10/17/2016    Polycythemia     Retinal detachment 9/30/2013    Right eye - 2013    Testosterone deficiency 3/7/2013       Social History:   TOBACCO:   reports that he has never smoked. He has never used smokeless tobacco.      ETOH:   reports that he drinks alcohol.     Current Medications:    Prior to Admission medications    Medication Sig Historical Provider, MD   aspirin EC 81 MG EC tablet Take 1 tablet by mouth daily with food. 5/27/16  Yes Luke Gale MD   Multiple Vitamin (MULTIVITAMIN) capsule Take 1 capsule by mouth daily 11/30/15  Yes Luke Gale MD   cycloSPORINE (RESTASIS) 0.05 % ophthalmic emulsion Place 1 drop into both eyes 2 times daily. 9/30/13  Yes Luke Gale MD       REVIEW OF SYSTEMS:  Review of Systems   Constitutional: Negative for chills and fever. HENT: Positive for sore throat. Respiratory: Positive for cough. Negative for chest tightness and shortness of breath. Cardiovascular: Negative for chest pain. Gastrointestinal: Positive for abdominal pain. Negative for nausea and vomiting. Physical Exam:      Vitals: BP (!) 152/90 (Site: Left Arm, Position: Sitting, Cuff Size: Medium Adult)   Pulse 88   Temp 98.1 °F (36.7 °C)   Wt 243 lb (110.2 kg)   SpO2 97%   BMI 34.87 kg/m²     Body mass index is 34.87 kg/m². Wt Readings from Last 3 Encounters:   01/24/18 243 lb (110.2 kg)   10/23/17 243 lb (110.2 kg)   06/22/17 246 lb (111.6 kg)     Physical Exam   Constitutional: He is oriented to person, place, and time. He appears well-developed and well-nourished. No distress. HENT:   Head: Normocephalic and atraumatic. Right Ear: External ear normal.   Left Ear: External ear normal.   Mouth/Throat: Oropharynx is clear and moist. No oropharyngeal exudate. Eyes: Conjunctivae and EOM are normal. Right eye exhibits no discharge. Left eye exhibits no discharge. No scleral icterus. Neck: Normal range of motion. Neck supple. Cardiovascular: Normal rate and normal heart sounds. No murmur heard. Pulmonary/Chest: Effort normal and breath sounds normal. No respiratory distress. He has no wheezes. He has no rales. Prolonged expirium     Abdominal: Soft. There is no tenderness. Lymphadenopathy:     He has no cervical adenopathy. Neurological: He is alert and oriented to person, place, and time.  He has

## 2018-01-27 ASSESSMENT — ENCOUNTER SYMPTOMS
CHEST TIGHTNESS: 0
NAUSEA: 0
COUGH: 1
SORE THROAT: 1
VOMITING: 0
SHORTNESS OF BREATH: 0
ABDOMINAL PAIN: 1

## 2018-01-30 RX ORDER — GUAIFENESIN 600 MG/1
600 TABLET, EXTENDED RELEASE ORAL 2 TIMES DAILY
Qty: 20 TABLET | Refills: 1 | Status: SHIPPED | OUTPATIENT
Start: 2018-01-30

## 2018-03-13 DIAGNOSIS — Z12.5 SCREENING PSA (PROSTATE SPECIFIC ANTIGEN): ICD-10-CM

## 2018-03-13 DIAGNOSIS — E34.9 TESTOSTERONE DEFICIENCY: Primary | Chronic | ICD-10-CM

## 2018-04-19 ENCOUNTER — OFFICE VISIT (OUTPATIENT)
Dept: DERMATOLOGY | Age: 68
End: 2018-04-19

## 2018-04-19 DIAGNOSIS — E34.9 TESTOSTERONE DEFICIENCY: Chronic | ICD-10-CM

## 2018-04-19 DIAGNOSIS — Z12.5 SCREENING PSA (PROSTATE SPECIFIC ANTIGEN): ICD-10-CM

## 2018-04-19 DIAGNOSIS — L57.0 AK (ACTINIC KERATOSIS): ICD-10-CM

## 2018-04-19 DIAGNOSIS — D48.5 NEOPLASM OF UNCERTAIN BEHAVIOR OF SKIN: ICD-10-CM

## 2018-04-19 DIAGNOSIS — L82.0 INFLAMED SEBORRHEIC KERATOSIS: Primary | ICD-10-CM

## 2018-04-19 PROCEDURE — 11101 PR BIOPSY, EACH ADDED LESION: CPT | Performed by: DERMATOLOGY

## 2018-04-19 PROCEDURE — 17003 DESTRUCT PREMALG LES 2-14: CPT | Performed by: DERMATOLOGY

## 2018-04-19 PROCEDURE — 17000 DESTRUCT PREMALG LESION: CPT | Performed by: DERMATOLOGY

## 2018-04-19 PROCEDURE — 17110 DESTRUCTION B9 LES UP TO 14: CPT | Performed by: DERMATOLOGY

## 2018-04-19 PROCEDURE — 11100 PR BIOPSY OF SKIN LESION: CPT | Performed by: DERMATOLOGY

## 2018-04-20 LAB — PROSTATE SPECIFIC ANTIGEN: 0.94 NG/ML (ref 0–4)

## 2018-04-21 LAB
SEX HORMONE BINDING GLOBULIN: 33 NMOL/L (ref 11–80)
TESTOSTERONE FREE-NONMALE: 77.5 PG/ML (ref 47–244)
TESTOSTERONE TOTAL: 383 NG/DL (ref 220–1000)

## 2018-04-25 ENCOUNTER — TELEPHONE (OUTPATIENT)
Dept: DERMATOLOGY | Age: 68
End: 2018-04-25

## 2018-04-25 ENCOUNTER — TELEPHONE (OUTPATIENT)
Dept: INTERNAL MEDICINE | Age: 68
End: 2018-04-25

## 2018-05-02 ENCOUNTER — OFFICE VISIT (OUTPATIENT)
Dept: DERMATOLOGY | Age: 68
End: 2018-05-02

## 2018-05-02 ENCOUNTER — OFFICE VISIT (OUTPATIENT)
Dept: INTERNAL MEDICINE | Age: 68
End: 2018-05-02

## 2018-05-02 VITALS
DIASTOLIC BLOOD PRESSURE: 94 MMHG | WEIGHT: 238 LBS | HEART RATE: 74 BPM | OXYGEN SATURATION: 98 % | BODY MASS INDEX: 34.15 KG/M2 | SYSTOLIC BLOOD PRESSURE: 152 MMHG

## 2018-05-02 DIAGNOSIS — M54.50 LOW BACK PAIN WITHOUT SCIATICA, UNSPECIFIED BACK PAIN LATERALITY, UNSPECIFIED CHRONICITY: ICD-10-CM

## 2018-05-02 DIAGNOSIS — K21.9 GASTROESOPHAGEAL REFLUX DISEASE WITHOUT ESOPHAGITIS: Chronic | ICD-10-CM

## 2018-05-02 DIAGNOSIS — C44.712 BCC (BASAL CELL CARCINOMA), LEG, RIGHT: Primary | ICD-10-CM

## 2018-05-02 DIAGNOSIS — E34.9 TESTOSTERONE DEFICIENCY: Chronic | ICD-10-CM

## 2018-05-02 DIAGNOSIS — E78.5 HYPERLIPIDEMIA, UNSPECIFIED HYPERLIPIDEMIA TYPE: Chronic | ICD-10-CM

## 2018-05-02 DIAGNOSIS — I10 ESSENTIAL HYPERTENSION: Primary | Chronic | ICD-10-CM

## 2018-05-02 DIAGNOSIS — R10.13 DYSPEPSIA: ICD-10-CM

## 2018-05-02 PROCEDURE — 1123F ACP DISCUSS/DSCN MKR DOCD: CPT | Performed by: INTERNAL MEDICINE

## 2018-05-02 PROCEDURE — G8598 ASA/ANTIPLAT THER USED: HCPCS | Performed by: INTERNAL MEDICINE

## 2018-05-02 PROCEDURE — 4040F PNEUMOC VAC/ADMIN/RCVD: CPT | Performed by: INTERNAL MEDICINE

## 2018-05-02 PROCEDURE — 3017F COLORECTAL CA SCREEN DOC REV: CPT | Performed by: INTERNAL MEDICINE

## 2018-05-02 PROCEDURE — 17262 DSTRJ MAL LES T/A/L 1.1-2.0: CPT | Performed by: DERMATOLOGY

## 2018-05-02 PROCEDURE — G8417 CALC BMI ABV UP PARAM F/U: HCPCS | Performed by: INTERNAL MEDICINE

## 2018-05-02 PROCEDURE — 99214 OFFICE O/P EST MOD 30 MIN: CPT | Performed by: INTERNAL MEDICINE

## 2018-05-02 PROCEDURE — 1036F TOBACCO NON-USER: CPT | Performed by: INTERNAL MEDICINE

## 2018-05-02 PROCEDURE — G8427 DOCREV CUR MEDS BY ELIG CLIN: HCPCS | Performed by: INTERNAL MEDICINE

## 2018-05-02 RX ORDER — GABAPENTIN 300 MG/1
300 CAPSULE ORAL NIGHTLY
Qty: 90 CAPSULE | Refills: 3 | Status: SHIPPED | OUTPATIENT
Start: 2018-05-02 | End: 2018-07-11 | Stop reason: SDUPTHER

## 2018-05-02 RX ORDER — IRBESARTAN 150 MG/1
150 TABLET ORAL DAILY
Qty: 90 TABLET | Refills: 2 | Status: SHIPPED | OUTPATIENT
Start: 2018-05-02

## 2018-05-02 RX ORDER — TESTOSTERONE CYPIONATE 200 MG/ML
INJECTION INTRAMUSCULAR
Qty: 6 ML | Refills: 1 | Status: CANCELLED | OUTPATIENT
Start: 2018-05-02 | End: 2018-07-02

## 2018-05-02 ASSESSMENT — ENCOUNTER SYMPTOMS
SHORTNESS OF BREATH: 0
VOMITING: 0
CHEST TIGHTNESS: 0
NAUSEA: 0
BLOOD IN STOOL: 0
ABDOMINAL PAIN: 1
DIARRHEA: 0

## 2018-05-02 ASSESSMENT — PATIENT HEALTH QUESTIONNAIRE - PHQ9
1. LITTLE INTEREST OR PLEASURE IN DOING THINGS: 0
SUM OF ALL RESPONSES TO PHQ9 QUESTIONS 1 & 2: 0
SUM OF ALL RESPONSES TO PHQ QUESTIONS 1-9: 0
2. FEELING DOWN, DEPRESSED OR HOPELESS: 0

## 2018-05-05 LAB — H PYLORI ANTIGEN STOOL: NEGATIVE

## 2018-05-06 DIAGNOSIS — R10.13 EPIGASTRIC PAIN: Primary | ICD-10-CM

## 2018-05-10 ENCOUNTER — HOSPITAL ENCOUNTER (OUTPATIENT)
Dept: ULTRASOUND IMAGING | Age: 68
Discharge: HOME | End: 2018-05-10
Attending: INTERNAL MEDICINE | Admitting: INTERNAL MEDICINE

## 2018-05-10 DIAGNOSIS — R10.13 EPIGASTRIC PAIN: ICD-10-CM

## 2018-05-10 DIAGNOSIS — L03.119 CELLULITIS AND ABSCESS OF LEG: ICD-10-CM

## 2018-05-10 DIAGNOSIS — L02.419 CELLULITIS AND ABSCESS OF LEG: ICD-10-CM

## 2018-05-29 ENCOUNTER — TELEPHONE (OUTPATIENT)
Dept: INTERNAL MEDICINE | Age: 68
End: 2018-05-29

## 2018-05-29 DIAGNOSIS — R10.9 STOMACH PAIN: Primary | ICD-10-CM

## 2018-06-06 PROBLEM — R10.9 STOMACH PAIN: Status: ACTIVE | Noted: 2018-06-06

## 2018-06-13 ENCOUNTER — TELEPHONE (OUTPATIENT)
Dept: INTERNAL MEDICINE | Age: 68
End: 2018-06-13

## 2018-06-14 ENCOUNTER — TELEPHONE (OUTPATIENT)
Dept: INTERNAL MEDICINE | Age: 68
End: 2018-06-14

## 2018-06-14 DIAGNOSIS — E34.9 TESTOSTERONE DEFICIENCY: Chronic | ICD-10-CM

## 2018-06-14 DIAGNOSIS — M54.50 LOW BACK PAIN WITHOUT SCIATICA, UNSPECIFIED BACK PAIN LATERALITY, UNSPECIFIED CHRONICITY: ICD-10-CM

## 2018-06-14 RX ORDER — METHOCARBAMOL 750 MG/1
750 TABLET, FILM COATED ORAL 2 TIMES DAILY
Qty: 180 TABLET | Refills: 3 | Status: SHIPPED | OUTPATIENT
Start: 2018-06-14

## 2018-06-14 RX ORDER — TESTOSTERONE CYPIONATE 200 MG/ML
INJECTION INTRAMUSCULAR
Qty: 6 VIAL | Refills: 1 | Status: SHIPPED | OUTPATIENT
Start: 2018-06-14 | End: 2018-06-15 | Stop reason: SDUPTHER

## 2018-06-15 ENCOUNTER — TELEPHONE (OUTPATIENT)
Dept: INTERNAL MEDICINE | Age: 68
End: 2018-06-15

## 2018-06-15 DIAGNOSIS — E34.9 TESTOSTERONE DEFICIENCY: Chronic | ICD-10-CM

## 2018-06-15 RX ORDER — TESTOSTERONE CYPIONATE 200 MG/ML
INJECTION, SOLUTION INTRAMUSCULAR
Qty: 6 VIAL | Refills: 1 | OUTPATIENT
Start: 2018-06-15 | End: 2018-11-16 | Stop reason: SDUPTHER

## 2018-07-11 ENCOUNTER — OFFICE VISIT (OUTPATIENT)
Dept: INTERNAL MEDICINE | Age: 68
End: 2018-07-11

## 2018-07-11 VITALS
OXYGEN SATURATION: 98 % | DIASTOLIC BLOOD PRESSURE: 78 MMHG | HEART RATE: 68 BPM | SYSTOLIC BLOOD PRESSURE: 132 MMHG | WEIGHT: 232 LBS | BODY MASS INDEX: 33.29 KG/M2

## 2018-07-11 DIAGNOSIS — D50.9 MICROCYTIC ANEMIA: ICD-10-CM

## 2018-07-11 DIAGNOSIS — M54.50 LOW BACK PAIN WITHOUT SCIATICA, UNSPECIFIED BACK PAIN LATERALITY, UNSPECIFIED CHRONICITY: ICD-10-CM

## 2018-07-11 DIAGNOSIS — R10.13 DYSPEPSIA: ICD-10-CM

## 2018-07-11 DIAGNOSIS — I10 ESSENTIAL HYPERTENSION: Primary | Chronic | ICD-10-CM

## 2018-07-11 DIAGNOSIS — R05.9 COUGH: ICD-10-CM

## 2018-07-11 DIAGNOSIS — Z13.1 SCREENING FOR DIABETES MELLITUS: ICD-10-CM

## 2018-07-11 DIAGNOSIS — R73.01 IFG (IMPAIRED FASTING GLUCOSE): ICD-10-CM

## 2018-07-11 DIAGNOSIS — R63.4 UNINTENTIONAL WEIGHT LOSS OF LESS THAN 10% BODY WEIGHT WITHIN 6 MONTHS: ICD-10-CM

## 2018-07-11 PROCEDURE — 1123F ACP DISCUSS/DSCN MKR DOCD: CPT | Performed by: INTERNAL MEDICINE

## 2018-07-11 PROCEDURE — 4040F PNEUMOC VAC/ADMIN/RCVD: CPT | Performed by: INTERNAL MEDICINE

## 2018-07-11 PROCEDURE — G8598 ASA/ANTIPLAT THER USED: HCPCS | Performed by: INTERNAL MEDICINE

## 2018-07-11 PROCEDURE — 99214 OFFICE O/P EST MOD 30 MIN: CPT | Performed by: INTERNAL MEDICINE

## 2018-07-11 PROCEDURE — 3017F COLORECTAL CA SCREEN DOC REV: CPT | Performed by: INTERNAL MEDICINE

## 2018-07-11 PROCEDURE — G8427 DOCREV CUR MEDS BY ELIG CLIN: HCPCS | Performed by: INTERNAL MEDICINE

## 2018-07-11 PROCEDURE — 1101F PT FALLS ASSESS-DOCD LE1/YR: CPT | Performed by: INTERNAL MEDICINE

## 2018-07-11 PROCEDURE — 1036F TOBACCO NON-USER: CPT | Performed by: INTERNAL MEDICINE

## 2018-07-11 PROCEDURE — G8417 CALC BMI ABV UP PARAM F/U: HCPCS | Performed by: INTERNAL MEDICINE

## 2018-07-11 RX ORDER — PROMETHAZINE HYDROCHLORIDE AND CODEINE PHOSPHATE 6.25; 1 MG/5ML; MG/5ML
5 SYRUP ORAL NIGHTLY PRN
Qty: 60 ML | Refills: 0 | Status: SHIPPED | OUTPATIENT
Start: 2018-07-11 | End: 2018-08-10

## 2018-07-11 RX ORDER — GABAPENTIN 300 MG/1
300 CAPSULE ORAL NIGHTLY
Qty: 90 CAPSULE | Refills: 3 | Status: SHIPPED | OUTPATIENT
Start: 2018-07-11 | End: 2019-01-03 | Stop reason: SDUPTHER

## 2018-07-11 ASSESSMENT — ENCOUNTER SYMPTOMS
NAUSEA: 0
BLOOD IN STOOL: 0
CHEST TIGHTNESS: 0
COUGH: 1
SHORTNESS OF BREATH: 0
ABDOMINAL PAIN: 0
VOMITING: 0

## 2018-07-11 NOTE — PROGRESS NOTES
(110.2 kg)     Physical Exam   Constitutional: He is oriented to person, place, and time. He appears well-developed and well-nourished. No distress. HENT:   Head: Normocephalic and atraumatic. Mouth/Throat: Oropharynx is clear and moist. No oropharyngeal exudate. Eyes: Conjunctivae and EOM are normal. Right eye exhibits no discharge. Left eye exhibits no discharge. No scleral icterus. Neck: Normal range of motion. Neck supple. No thyromegaly present. Cardiovascular: Normal rate and normal heart sounds. No murmur heard. Pulmonary/Chest: Effort normal and breath sounds normal. No respiratory distress. He has no wheezes. He has no rales. Abdominal: Soft. He exhibits no distension. There is no hepatosplenomegaly. There is no tenderness. There is no rebound. Musculoskeletal: He exhibits no edema or deformity. Lymphadenopathy:        Head (right side): No submental, no submandibular, no tonsillar, no preauricular, no posterior auricular and no occipital adenopathy present. Head (left side): No submental, no submandibular, no tonsillar, no preauricular, no posterior auricular and no occipital adenopathy present. He has no cervical adenopathy. Right cervical: No superficial cervical and no deep cervical adenopathy present. Left cervical: No superficial cervical and no deep cervical adenopathy present. Right: No supraclavicular adenopathy present. Left: No supraclavicular adenopathy present. Neurological: He is alert and oriented to person, place, and time. He has normal reflexes. No cranial nerve deficit. He exhibits normal muscle tone. GCS eye subscore is 4. GCS verbal subscore is 5. GCS motor subscore is 6. Skin: No rash noted. He is not diaphoretic. Psychiatric: He has a normal mood and affect. His behavior is normal. Judgment and thought content normal.          Assessment/Plan:   Teresa Mclean was seen today for hypertension and cough.     Diagnoses and all orders for this visit:    Essential hypertension  Well controlled- no changes in medication today. Unintentional weight loss of less than 10% body weight within 6 months  I am not sure of weather LOW was intentional or not. His dyspepsia is better which is a good sign but he has anemia. Last phlebotomy 1 year ago so I am not sure if that can explain the anemia which is microcytic and with high RDW- likely iron def/ GIB. I asked him to schedule ASAP with Dr Emory Hull. I will do some more blood work and consider CT scan as there was some fullness in the pancrease which was unexplained. -     CBC Auto Differential; Future  -     Comprehensive Metabolic Panel; Future  -     LIPASE; Future    Dyspepsia  Better but as above- recommend seeing GI   -     CBC Auto Differential; Future  -     Comprehensive Metabolic Panel; Future  -     LIPASE; Future    Microcytic anemia  As above  With for repeat of blood work   -     CBC Auto Differential; Future  -     Comprehensive Metabolic Panel; Future  -     LIPASE; Future    Screening for diabetes mellitus  -     Hemoglobin A1C; Future    IFG (impaired fasting glucose)  -     Hemoglobin A1C; Future    Cough  Chronic cough  Normal CXR 1/2018   He has known reflux which may be the cause as the cough is mainly nocturnal. Less likely due to asthma as per normal lung exam.   I will consider more chest imaging according to my results with the blood work. He has no Hx of smoking which reduced significantly the odds of lung malignancy   -     promethazine-codeine (PHENERGAN WITH CODEINE) 6.25-10 MG/5ML syrup; Take 5 mLs by mouth nightly as needed for Cough for up to 30 days. .    Low back pain without sciatica, unspecified back pain laterality, unspecified chronicity  -     gabapentin (NEURONTIN) 300 MG capsule; Take 1 capsule by mouth nightly for 30 days. .    - Patient was encouraged to call the office (and ask to see me) or be seen by other provider for any worsening or lack    of improvement in his symptoms.       - Pt was asked to schedule an appointment in 3 months, and to let me know of any scheduling difficulties. Additional patients instructions (if given): There are no Patient Instructions on file for this visit.     Afshan Soria M.D.   7/11/2018, 12:59 PM

## 2018-07-12 DIAGNOSIS — R10.9 STOMACH PAIN: Primary | ICD-10-CM

## 2018-08-02 ENCOUNTER — TELEPHONE (OUTPATIENT)
Dept: INTERNAL MEDICINE | Age: 68
End: 2018-08-02

## 2018-08-02 NOTE — TELEPHONE ENCOUNTER
Pt called to inform Dr. Pete Melara that he saw Dr. Delgado Eduardo yesterday. He has ordered a CT scan. He had labs drawn for Dr. Pete Melara this morning at [facility]. He would like the results faxed to Dr. Delgado Eduardo once received.

## 2018-08-21 ENCOUNTER — HOSPITAL ENCOUNTER (OUTPATIENT)
Age: 68
Discharge: HOME | End: 2018-08-21
Payer: MEDICARE

## 2018-08-21 ENCOUNTER — HOSPITAL ENCOUNTER (OUTPATIENT)
Dept: CT IMAGING | Age: 68
Discharge: HOME | End: 2018-08-21
Payer: MEDICARE

## 2018-08-21 DIAGNOSIS — R10.13 ABDOMINAL PAIN, EPIGASTRIC: ICD-10-CM

## 2018-08-21 PROCEDURE — 74177 CT ABD & PELVIS W/CONTRAST: CPT

## 2018-08-21 PROCEDURE — 6360000004 HC RX CONTRAST MEDICATION: Performed by: INTERNAL MEDICINE

## 2018-08-21 RX ADMIN — IOPAMIDOL 75 ML: 755 INJECTION, SOLUTION INTRAVENOUS at 12:07

## 2018-08-21 RX ADMIN — IOHEXOL 50 ML: 240 INJECTION, SOLUTION INTRATHECAL; INTRAVASCULAR; INTRAVENOUS; ORAL at 12:07

## 2018-09-24 ENCOUNTER — TELEPHONE (OUTPATIENT)
Dept: INTERNAL MEDICINE CLINIC | Age: 68
End: 2018-09-24

## 2018-09-24 DIAGNOSIS — M54.50 LOW BACK PAIN WITHOUT SCIATICA, UNSPECIFIED BACK PAIN LATERALITY, UNSPECIFIED CHRONICITY: ICD-10-CM

## 2018-09-25 ENCOUNTER — TELEPHONE (OUTPATIENT)
Dept: FAMILY MEDICINE CLINIC | Age: 68
End: 2018-09-25

## 2018-09-25 ENCOUNTER — TELEPHONE (OUTPATIENT)
Dept: INTERNAL MEDICINE CLINIC | Age: 68
End: 2018-09-25

## 2018-09-25 RX ORDER — METHOCARBAMOL 500 MG/1
750 TABLET, FILM COATED ORAL 2 TIMES DAILY
Qty: 90 TABLET | Refills: 1 | Status: SHIPPED | OUTPATIENT
Start: 2018-09-25 | End: 2018-11-24

## 2018-09-25 NOTE — TELEPHONE ENCOUNTER
Pharmacy called stated they are out of 750 mg of robaxin. They do have 500 mg. Is it ok to switch? If so please send new instructions.

## 2018-09-26 ENCOUNTER — TELEPHONE (OUTPATIENT)
Dept: INTERNAL MEDICINE CLINIC | Age: 68
End: 2018-09-26

## 2018-09-26 NOTE — TELEPHONE ENCOUNTER
Pt would like his lab results from 8.2.18, labs done @ [facility]   Pt would like them mailed to him.    Pt would like results added to [email or web address] if not mailed

## 2018-09-26 NOTE — TELEPHONE ENCOUNTER
PA SUBMITTED VIA CMM FOR Methocarbamol 500MG OR TABS  Key: [id number] - Rx #: [id number]   STATUS : PENDING

## 2018-10-18 ENCOUNTER — OFFICE VISIT (OUTPATIENT)
Dept: INTERNAL MEDICINE CLINIC | Age: 68
End: 2018-10-18
Payer: MEDICARE

## 2018-10-18 ENCOUNTER — OFFICE VISIT (OUTPATIENT)
Dept: DERMATOLOGY | Age: 68
End: 2018-10-18
Payer: MEDICARE

## 2018-10-18 VITALS
OXYGEN SATURATION: 97 % | SYSTOLIC BLOOD PRESSURE: 138 MMHG | DIASTOLIC BLOOD PRESSURE: 88 MMHG | BODY MASS INDEX: 33.43 KG/M2 | HEART RATE: 79 BPM | WEIGHT: 233 LBS

## 2018-10-18 DIAGNOSIS — L57.0 AK (ACTINIC KERATOSIS): ICD-10-CM

## 2018-10-18 DIAGNOSIS — D48.5 NEOPLASM OF UNCERTAIN BEHAVIOR OF SKIN: Primary | ICD-10-CM

## 2018-10-18 DIAGNOSIS — D75.1 POLYCYTHEMIA: ICD-10-CM

## 2018-10-18 DIAGNOSIS — R09.81 NASAL CONGESTION: Primary | ICD-10-CM

## 2018-10-18 DIAGNOSIS — L97.919 ULCER OF RIGHT LOWER LEG, WITH UNSPECIFIED SEVERITY (HCC): ICD-10-CM

## 2018-10-18 DIAGNOSIS — E34.9 TESTOSTERONE DEFICIENCY: Chronic | ICD-10-CM

## 2018-10-18 DIAGNOSIS — R19.7 DIARRHEA, UNSPECIFIED TYPE: ICD-10-CM

## 2018-10-18 DIAGNOSIS — Z23 NEED FOR INFLUENZA VACCINATION: ICD-10-CM

## 2018-10-18 DIAGNOSIS — E78.5 HYPERLIPIDEMIA, UNSPECIFIED HYPERLIPIDEMIA TYPE: Chronic | ICD-10-CM

## 2018-10-18 DIAGNOSIS — R73.01 IFG (IMPAIRED FASTING GLUCOSE): ICD-10-CM

## 2018-10-18 DIAGNOSIS — L82.1 SK (SEBORRHEIC KERATOSIS): ICD-10-CM

## 2018-10-18 DIAGNOSIS — N52.9 ERECTILE DYSFUNCTION, UNSPECIFIED ERECTILE DYSFUNCTION TYPE: Chronic | ICD-10-CM

## 2018-10-18 DIAGNOSIS — I10 ESSENTIAL HYPERTENSION: Chronic | ICD-10-CM

## 2018-10-18 PROCEDURE — G8417 CALC BMI ABV UP PARAM F/U: HCPCS | Performed by: INTERNAL MEDICINE

## 2018-10-18 PROCEDURE — 3017F COLORECTAL CA SCREEN DOC REV: CPT | Performed by: INTERNAL MEDICINE

## 2018-10-18 PROCEDURE — 4040F PNEUMOC VAC/ADMIN/RCVD: CPT | Performed by: INTERNAL MEDICINE

## 2018-10-18 PROCEDURE — 11100 PR BIOPSY OF SKIN LESION: CPT | Performed by: DERMATOLOGY

## 2018-10-18 PROCEDURE — G0008 ADMIN INFLUENZA VIRUS VAC: HCPCS | Performed by: INTERNAL MEDICINE

## 2018-10-18 PROCEDURE — 17000 DESTRUCT PREMALG LESION: CPT | Performed by: DERMATOLOGY

## 2018-10-18 PROCEDURE — 99213 OFFICE O/P EST LOW 20 MIN: CPT | Performed by: DERMATOLOGY

## 2018-10-18 PROCEDURE — 17003 DESTRUCT PREMALG LES 2-14: CPT | Performed by: DERMATOLOGY

## 2018-10-18 PROCEDURE — G8427 DOCREV CUR MEDS BY ELIG CLIN: HCPCS | Performed by: INTERNAL MEDICINE

## 2018-10-18 PROCEDURE — 90662 IIV NO PRSV INCREASED AG IM: CPT | Performed by: INTERNAL MEDICINE

## 2018-10-18 PROCEDURE — G8482 FLU IMMUNIZE ORDER/ADMIN: HCPCS | Performed by: INTERNAL MEDICINE

## 2018-10-18 PROCEDURE — 1123F ACP DISCUSS/DSCN MKR DOCD: CPT | Performed by: INTERNAL MEDICINE

## 2018-10-18 PROCEDURE — G8427 DOCREV CUR MEDS BY ELIG CLIN: HCPCS | Performed by: DERMATOLOGY

## 2018-10-18 PROCEDURE — G8598 ASA/ANTIPLAT THER USED: HCPCS | Performed by: INTERNAL MEDICINE

## 2018-10-18 PROCEDURE — 4040F PNEUMOC VAC/ADMIN/RCVD: CPT | Performed by: DERMATOLOGY

## 2018-10-18 PROCEDURE — 1123F ACP DISCUSS/DSCN MKR DOCD: CPT | Performed by: DERMATOLOGY

## 2018-10-18 PROCEDURE — 1101F PT FALLS ASSESS-DOCD LE1/YR: CPT | Performed by: INTERNAL MEDICINE

## 2018-10-18 PROCEDURE — G8417 CALC BMI ABV UP PARAM F/U: HCPCS | Performed by: DERMATOLOGY

## 2018-10-18 PROCEDURE — 3017F COLORECTAL CA SCREEN DOC REV: CPT | Performed by: DERMATOLOGY

## 2018-10-18 PROCEDURE — 1101F PT FALLS ASSESS-DOCD LE1/YR: CPT | Performed by: DERMATOLOGY

## 2018-10-18 PROCEDURE — 99214 OFFICE O/P EST MOD 30 MIN: CPT | Performed by: INTERNAL MEDICINE

## 2018-10-18 PROCEDURE — G8598 ASA/ANTIPLAT THER USED: HCPCS | Performed by: DERMATOLOGY

## 2018-10-18 PROCEDURE — 1036F TOBACCO NON-USER: CPT | Performed by: DERMATOLOGY

## 2018-10-18 PROCEDURE — G8482 FLU IMMUNIZE ORDER/ADMIN: HCPCS | Performed by: DERMATOLOGY

## 2018-10-18 PROCEDURE — 1036F TOBACCO NON-USER: CPT | Performed by: INTERNAL MEDICINE

## 2018-10-18 RX ORDER — FLUTICASONE PROPIONATE 50 MCG
2 SPRAY, SUSPENSION (ML) NASAL DAILY
Qty: 1 BOTTLE | Refills: 3 | Status: SHIPPED | OUTPATIENT
Start: 2018-10-18

## 2018-10-18 ASSESSMENT — ENCOUNTER SYMPTOMS
SORE THROAT: 1
VOMITING: 0
BLOOD IN STOOL: 0
COUGH: 1
ABDOMINAL PAIN: 0
NAUSEA: 0
CHEST TIGHTNESS: 0
SHORTNESS OF BREATH: 0
DIARRHEA: 1

## 2018-10-18 NOTE — PROGRESS NOTES
90 tablet 3    carvedilol (COREG) 12.5 MG tablet Take 1 tablet by mouth 2 times daily 180 tablet 3    omeprazole (PRILOSEC) 40 MG delayed release capsule Take 1 capsule by mouth 2 times daily 180 capsule 3    fluticasone-vilanterol (BREO ELLIPTA) 100-25 MCG/INH AEPB inhaler Inhale 1 puff into the lungs daily 30 each 2    beclomethasone (QNASL) 80 MCG/ACT AERS nasal spray 1 spray by Each Nare route daily 1 Inhaler 2    Hypodermic Needles-Disposable (SAFETY-AB NEEDLE 18G) MISC Use once a month to administer testosterone 100 each 0    Syringe/Needle, Disp, (SYRINGE 3CC/22GX1\") 22G X 1\" 3 ML MISC Use once a month to administer testosterone 100 each 0    diazepam (VALIUM) 10 MG tablet Use as directed -- take one the night before surgery and one on the morning of surgery 2 tablet 0    docusate sodium (COLACE) 100 MG capsule Take 100 mg by mouth 2 times daily      aspirin EC 81 MG EC tablet Take 1 tablet by mouth daily with food.  Multiple Vitamin (MULTIVITAMIN) capsule Take 1 capsule by mouth daily      cycloSPORINE (RESTASIS) 0.05 % ophthalmic emulsion Place 1 drop into both eyes 2 times daily. Physical Examination       The following were examined and determined to be normal: Psych/Neuro, Head/face, Conjunctivae/eyelids, Gums/teeth/lips, Neck, Breast/axilla/chest, Abdomen, Back, LUE, RLE, LLE and Nails/digits. The following were examined and determined to be abnormal: Scalp/hair and RUE. Well appearing. 1.  Right distal ulnar forearm - 1.7 cm irreg shaped smooth brown patch. 2.  Crown of the scalp - 5 ill defined irreg shaped keratotic pink macules. 3.  Trunk, extremities: stuck-on appearing tan-brown verrucous papules and plaques. Assessment and Plan     1. Neoplasm of uncertain behavior of skin, right ulnar forearm - lentigo vs lentigo maligna    Discussed possible diagnosis; patient agreeable to biopsy (verbal consent obtained).     The area(s) to be biopsied

## 2018-10-18 NOTE — PROGRESS NOTES
Outpatient Note for established Patient - regular Visit    History Obtained From:  patient, electronic medical record  Is the patient new to me ? - No    HISTORY OF PRESENT ILLNESS:   The patient is a 76 y.o. male who is here today for :  Nando Bradford was seen today for hypertension and nasal congestion. Diagnoses and all orders for this visit:    Nasal congestion  No SOB/ , some cough   No fever chills. Some sore throat. He has seen      Essential hypertension  BP at home: morning -140/80, evening 120s/80  He is on Avapro , Coreg, Aspirin. He is not on a statin    Hyperlipidemia, unspecified hyperlipidemia type    Testosterone deficiency  Patient has been on testosterone for testosterone deficiency for long time    Polycythemia  He has not done phlebotomy recently   He is followed by Dr Dolores Casas     Erectile dysfunction, unspecified erectile dysfunction type    Diarrhea:   He is followed by Dr Sheldon Cruz:  CT abdomen:  1. Mild circumferential wall thickening involving nearly the entirety of the   colon, most consistent with a diffuse infectious or inflammatory colitis. There is no evidence of pneumatosis, perforation, or free air. 2. Colonic diverticulosis. 3. Bilateral fat containing inguinal hernias. Stool sample for infectious for infectious process- neg  Now scheduled for sigmoidoscopy and EGD.    diarrhea is daily, no blood he does have abdominal pain mainly epigastric and some periumbilical.     Past Medical History:        Diagnosis Date    Allergic rhinitis 6/26/2012    Anxiety 11/8/2010    Cataracts, bilateral 11/8/2010    Cervical radiculopathy 11/8/2010    Chronic left-sided low back pain with left-sided sciatica 6/22/2017    Decreased hearing 11/8/2010    Detached retina 11/8/2010    DJD (degenerative joint disease) of lumbar spine 11/8/2010    Dyspnea on exertion 11/8/2010    Erectile dysfunction 11/8/2010    Essential hypertension 11/4/2015    Fatty liver 11/8/2010    diarrhea. Negative for abdominal pain, blood in stool, nausea and vomiting. Genitourinary: Negative for hematuria. Skin: Negative for rash. Allergic/Immunologic: Negative for immunocompromised state. Neurological: Negative for dizziness and headaches. Psychiatric/Behavioral: Negative for dysphoric mood and suicidal ideas. Physical Exam:      Vitals: /88   Pulse 79   Wt 233 lb (105.7 kg)   SpO2 97%   BMI 33.43 kg/m²     Body mass index is 33.43 kg/m². Wt Readings from Last 3 Encounters:   10/18/18 233 lb (105.7 kg)   07/11/18 232 lb (105.2 kg)   05/02/18 238 lb (108 kg)     Physical Exam   Constitutional: He is oriented to person, place, and time. He appears well-developed and well-nourished. No distress. HENT:   Head: Normocephalic and atraumatic. Right Ear: External ear normal.   Left Ear: External ear normal.   Nose: Nose normal.   Mouth/Throat: Oropharynx is clear and moist. No oropharyngeal exudate. Eyes: Conjunctivae and EOM are normal. Right eye exhibits no discharge. Left eye exhibits no discharge. No scleral icterus. Neck: Normal range of motion. Neck supple. No thyromegaly present. Cardiovascular: Normal rate and normal heart sounds. No murmur heard. Pulmonary/Chest: Effort normal and breath sounds normal. No respiratory distress. He has no wheezes. He has no rales. Abdominal: Soft. He exhibits no distension. There is no tenderness. There is no guarding. Musculoskeletal: He exhibits no edema or deformity. Lymphadenopathy:        Head (right side): No submental, no submandibular, no tonsillar, no preauricular, no posterior auricular and no occipital adenopathy present. Head (left side): No submental, no submandibular, no tonsillar, no preauricular, no posterior auricular and no occipital adenopathy present. He has no cervical adenopathy. Right cervical: No superficial cervical and no deep cervical adenopathy present.        Left cervical: No

## 2018-10-22 LAB — DERMATOLOGY PATHOLOGY REPORT: NORMAL

## 2018-10-30 ENCOUNTER — TELEPHONE (OUTPATIENT)
Dept: INTERNAL MEDICINE CLINIC | Age: 68
End: 2018-10-30

## 2018-11-01 ENCOUNTER — TELEPHONE (OUTPATIENT)
Dept: DERMATOLOGY | Age: 68
End: 2018-11-01

## 2018-11-01 NOTE — TELEPHONE ENCOUNTER
Patient is calling for results from biopsy 10/18/2018. Can you please return his call at [phone]? Thank you!

## 2018-11-16 ENCOUNTER — TELEPHONE (OUTPATIENT)
Dept: INTERNAL MEDICINE CLINIC | Age: 68
End: 2018-11-16

## 2018-11-16 DIAGNOSIS — E34.9 TESTOSTERONE DEFICIENCY: Chronic | ICD-10-CM

## 2018-11-16 RX ORDER — TESTOSTERONE CYPIONATE 200 MG/ML
INJECTION INTRAMUSCULAR
Qty: 6 VIAL | Refills: 2 | Status: SHIPPED | OUTPATIENT
Start: 2018-11-16 | End: 2021-11-17

## 2018-11-16 NOTE — TELEPHONE ENCOUNTER
Pt called in wanting to be transferred to [facility]. Would not provide reason for call. Please cb at [phone].

## 2018-12-28 RX ORDER — MONTELUKAST SODIUM 10 MG/1
10 TABLET ORAL NIGHTLY
Qty: 90 TABLET | Refills: 3 | Status: SHIPPED | OUTPATIENT
Start: 2018-12-28

## 2019-01-03 DIAGNOSIS — M54.50 LOW BACK PAIN WITHOUT SCIATICA, UNSPECIFIED BACK PAIN LATERALITY, UNSPECIFIED CHRONICITY: ICD-10-CM

## 2019-01-03 DIAGNOSIS — I10 ESSENTIAL HYPERTENSION: Chronic | ICD-10-CM

## 2019-01-03 RX ORDER — GABAPENTIN 300 MG/1
300 CAPSULE ORAL NIGHTLY
Qty: 90 CAPSULE | Refills: 0 | OUTPATIENT
Start: 2019-01-03 | End: 2019-11-04

## 2019-01-03 RX ORDER — CARVEDILOL 12.5 MG/1
12.5 TABLET ORAL 2 TIMES DAILY
Qty: 180 TABLET | Refills: 3 | Status: SHIPPED | OUTPATIENT
Start: 2019-01-03

## 2019-01-07 ENCOUNTER — TELEPHONE (OUTPATIENT)
Dept: INTERNAL MEDICINE CLINIC | Age: 69
End: 2019-01-07

## 2019-04-24 ENCOUNTER — OFFICE VISIT (OUTPATIENT)
Dept: DERMATOLOGY | Age: 69
End: 2019-04-24
Payer: MEDICARE

## 2019-04-24 DIAGNOSIS — L82.1 SK (SEBORRHEIC KERATOSIS): ICD-10-CM

## 2019-04-24 DIAGNOSIS — L24.9 IRRITANT DERMATITIS: ICD-10-CM

## 2019-04-24 DIAGNOSIS — L82.0 INFLAMED SEBORRHEIC KERATOSIS: Primary | ICD-10-CM

## 2019-04-24 DIAGNOSIS — L57.0 AK (ACTINIC KERATOSIS): ICD-10-CM

## 2019-04-24 PROCEDURE — 99213 OFFICE O/P EST LOW 20 MIN: CPT | Performed by: DERMATOLOGY

## 2019-04-24 PROCEDURE — 4040F PNEUMOC VAC/ADMIN/RCVD: CPT | Performed by: DERMATOLOGY

## 2019-04-24 PROCEDURE — 17110 DESTRUCTION B9 LES UP TO 14: CPT | Performed by: DERMATOLOGY

## 2019-04-24 PROCEDURE — 3017F COLORECTAL CA SCREEN DOC REV: CPT | Performed by: DERMATOLOGY

## 2019-04-24 PROCEDURE — G8598 ASA/ANTIPLAT THER USED: HCPCS | Performed by: DERMATOLOGY

## 2019-04-24 PROCEDURE — G8417 CALC BMI ABV UP PARAM F/U: HCPCS | Performed by: DERMATOLOGY

## 2019-04-24 PROCEDURE — 1123F ACP DISCUSS/DSCN MKR DOCD: CPT | Performed by: DERMATOLOGY

## 2019-04-24 PROCEDURE — 1036F TOBACCO NON-USER: CPT | Performed by: DERMATOLOGY

## 2019-04-24 PROCEDURE — G8427 DOCREV CUR MEDS BY ELIG CLIN: HCPCS | Performed by: DERMATOLOGY

## 2019-04-24 RX ORDER — TRIAMCINOLONE ACETONIDE 1 MG/G
CREAM TOPICAL
Qty: 80 G | Refills: 0 | Status: SHIPPED | OUTPATIENT
Start: 2019-04-24 | End: 2019-08-19 | Stop reason: SDUPTHER

## 2019-04-24 NOTE — PROGRESS NOTES
[facility]  Ilda Cox MD  [phone]      Lucy Haynes  1950    76 y.o. male     Date of Visit: 4/24/2019    Chief Complaint: skin lesions    History of Present Illness:    1. He complains of several intensely pruritic lesions on the back and scalp. 2.  Follow up for AKs on the scalp - treated with cryotherapy at last visit. Denies any signs of recurrence. 3.  He complains of few asymptomatic growths on the anterior trunk. 4.  He complains of a pruritic eruption on the upper back. Derm Hx:     Superficial BCC of the right mid posterior lateral thigh-treated with curettage on 5/2/2018.       Review of Systems:  Skin: No new or changing moles. Past Medical History, Family History, Surgical History, Medications and Allergies reviewed.     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    Dyspnea on exertion 11/8/2010    Erectile dysfunction 11/8/2010    Essential hypertension 11/4/2015    Fatty liver 11/8/2010    Gastroesophageal reflux disease with esophagitis 5/27/2016    Gastroesophageal reflux disease without esophagitis 11/30/2015    GERD (gastroesophageal reflux disease) 11/8/2010    Heme positive stool 11/8/2010    Hyperglycemia 11/8/2010    Hyperlipidemia 11/8/2010    Hypertension 11/8/2010    Left ventricular hypertrophy 11/8/2010    Lumbar disc disease 1/3/2013    Lumbar disc herniation 11/8/2010    Lumbar facet arthropathy 1/3/2013    Lumbar radiculopathy 11/8/2010    Lumbar spinal stenosis 1/3/2013    Obesity 11/8/2010    Perennial allergic rhinitis 10/17/2016    Polycythemia     Retinal detachment 9/30/2013    Right eye - 2013    Testosterone deficiency 3/7/2013     Past Surgical History:   Procedure Laterality Date    BACK SURGERY  07/01/2017    CATARACT REMOVAL WITH IMPLANT Left November 11, 2008    Dr. Darci Meehan    COLONOSCOPY  December 1, 2010    Dr. Criss Saul  02/13/2017    [address]      w/graft per PT (upper front area per PT)   [facility]  August 1, 2013    Dr. Brenna Muñoz - right L3-L4 and L4-L5 - [facility]  January 14, 2010    Dr. Andrea Flores Right November 2000    Dr. Mendez Bautista - Angelo Fleischer Left March 2007    Dr. Yuliya Bales and 1980    in-grown toe nail    UPPER GASTROINTESTINAL ENDOSCOPY  August 3, 2012    Dr. Silviano Ross  May 2, 2016    Dr. Wilcox Pin VITRECTOMY Left April 30, 2008    Dr. Campbell Heredia - trans pars plana vitrectomy, fluid-air exchange cryoretinopexy       Allergies   Allergen Reactions    Clindamycin/Lincomycin Nausea Only     Outpatient Medications Marked as Taking for the 4/24/19 encounter (Office Visit) with Melissa Lynne MD   Medication Sig Dispense Refill    DICLOFENAC PO Take by mouth      triamcinolone (KENALOG) 0.1 % cream Apply to affected area twice daily for up to 2 weeks or until improved.  80 g 0    carvedilol (COREG) 12.5 MG tablet Take 1 tablet by mouth 2 times daily 180 tablet 3    montelukast (SINGULAIR) 10 MG tablet Take 1 tablet by mouth nightly 90 tablet 3    fluticasone (FLONASE) 50 MCG/ACT nasal spray 2 sprays by Nasal route daily 1 Bottle 3    methocarbamol (ROBAXIN-750) 750 MG tablet Take 1 tablet by mouth 2 times daily 180 tablet 3    fluticasone (ARNUITY ELLIPTA) 200 MCG/ACT AEPB Inhale 1 puff into the lungs daily      irbesartan (AVAPRO) 150 MG tablet Take 1 tablet by mouth daily 90 tablet 2    guaiFENesin (MUCINEX) 600 MG extended release tablet Take 1 tablet by mouth 2 times daily 20 tablet 1    omeprazole (PRILOSEC) 40 MG delayed release capsule Take 1 capsule by mouth 2 times daily 180 capsule 3    fluticasone-vilanterol (BREO ELLIPTA) 100-25 MCG/INH AEPB inhaler Inhale 1 puff into the lungs daily 30 each 2    beclomethasone (QNASL) 80 MCG/ACT AERS nasal spray 1 spray by Each Nare route daily 1 Inhaler 2    Hypodermic Needles-Disposable (SAFETY-AB NEEDLE 18G) MISC Use once a month to administer testosterone 100 each 0    Syringe/Needle, Disp, (SYRINGE 3CC/22GX1\") 22G X 1\" 3 ML MISC Use once a month to administer testosterone 100 each 0    diazepam (VALIUM) 10 MG tablet Use as directed -- take one the night before surgery and one on the morning of surgery 2 tablet 0    docusate sodium (COLACE) 100 MG capsule Take 100 mg by mouth 2 times daily      aspirin EC 81 MG EC tablet Take 1 tablet by mouth daily with food.  Multiple Vitamin (MULTIVITAMIN) capsule Take 1 capsule by mouth daily      cycloSPORINE (RESTASIS) 0.05 % ophthalmic emulsion Place 1 drop into both eyes 2 times daily. Physical Examination       The following were examined and determined to be normal: Psych/Neuro, Head/face, Conjunctivae/eyelids, Gums/teeth/lips, Neck, Abdomen, RUE and LUE. The following were examined and determined to be abnormal: Scalp/hair, Breast/axilla/chest and Back. Well appearing. 1.  Right lateral crow of the scalp - 1, back - 5, left lower chest - 1: stuck on appearing verrucous pink brown papules and plaques. 2.  Clear. 3.  Anterior trunk - stuck-on appearing tan-brown verrucous papules and plaques. 4.  Upper back - few ill defined scaly pink macules and patches. Assessment and Plan     1.  Inflamed seborrheic keratosis     2 cycles of liquid nitrogen applied to 7 AKs: Right lateral crow of the scalp - 1, back - 5, left lower chest - 1. Patient was educated regarding the potential risks of blister formation, discomfort, hypopigmentation, and scar. Wound care was discussed. 2. AK (actinic keratosis) - clear after cryotherapy    Continue sun protective behaviors. 3. SK (seborrheic keratosis)     Reassurance. 4. Irritant dermatitis - mild    Triamcinolone 0.1% cream twice daily for up to 2 weeks or until improved. Return in about 6 months (around 10/24/2019).

## 2019-08-19 RX ORDER — TRIAMCINOLONE ACETONIDE 1 MG/G
CREAM TOPICAL
Qty: 80 G | Refills: 1 | Status: SHIPPED | OUTPATIENT
Start: 2019-08-19 | End: 2020-04-21 | Stop reason: SDUPTHER

## 2019-10-22 ENCOUNTER — TELEPHONE (OUTPATIENT)
Dept: DERMATOLOGY | Age: 69
End: 2019-10-22

## 2019-11-04 ENCOUNTER — OFFICE VISIT (OUTPATIENT)
Dept: DERMATOLOGY | Age: 69
End: 2019-11-04
Payer: MEDICARE

## 2019-11-04 DIAGNOSIS — L82.0 INFLAMED SEBORRHEIC KERATOSIS: Primary | ICD-10-CM

## 2019-11-04 DIAGNOSIS — L85.3 XEROSIS CUTIS: ICD-10-CM

## 2019-11-04 DIAGNOSIS — Z85.828 HISTORY OF BASAL CELL CARCINOMA: ICD-10-CM

## 2019-11-04 DIAGNOSIS — L57.0 AK (ACTINIC KERATOSIS): ICD-10-CM

## 2019-11-04 PROCEDURE — G8484 FLU IMMUNIZE NO ADMIN: HCPCS | Performed by: DERMATOLOGY

## 2019-11-04 PROCEDURE — 99213 OFFICE O/P EST LOW 20 MIN: CPT | Performed by: DERMATOLOGY

## 2019-11-04 PROCEDURE — 1036F TOBACCO NON-USER: CPT | Performed by: DERMATOLOGY

## 2019-11-04 PROCEDURE — G8421 BMI NOT CALCULATED: HCPCS | Performed by: DERMATOLOGY

## 2019-11-04 PROCEDURE — 3017F COLORECTAL CA SCREEN DOC REV: CPT | Performed by: DERMATOLOGY

## 2019-11-04 PROCEDURE — 17000 DESTRUCT PREMALG LESION: CPT | Performed by: DERMATOLOGY

## 2019-11-04 PROCEDURE — 1123F ACP DISCUSS/DSCN MKR DOCD: CPT | Performed by: DERMATOLOGY

## 2019-11-04 PROCEDURE — G8598 ASA/ANTIPLAT THER USED: HCPCS | Performed by: DERMATOLOGY

## 2019-11-04 PROCEDURE — 4040F PNEUMOC VAC/ADMIN/RCVD: CPT | Performed by: DERMATOLOGY

## 2019-11-04 PROCEDURE — 17003 DESTRUCT PREMALG LES 2-14: CPT | Performed by: DERMATOLOGY

## 2019-11-04 PROCEDURE — 17110 DESTRUCTION B9 LES UP TO 14: CPT | Performed by: DERMATOLOGY

## 2019-11-04 PROCEDURE — G8427 DOCREV CUR MEDS BY ELIG CLIN: HCPCS | Performed by: DERMATOLOGY

## 2020-02-11 ENCOUNTER — HOSPITAL ENCOUNTER (OUTPATIENT)
Age: 70
Discharge: HOME | End: 2020-02-11
Payer: MEDICARE

## 2020-02-11 LAB — URINE TRICHOMONAS EVALUATION: NORMAL

## 2020-02-11 PROCEDURE — 87491 CHLMYD TRACH DNA AMP PROBE: CPT

## 2020-02-11 PROCEDURE — 81015 MICROSCOPIC EXAM OF URINE: CPT

## 2020-02-11 PROCEDURE — 87591 N.GONORRHOEAE DNA AMP PROB: CPT

## 2020-02-12 LAB
C. TRACHOMATIS DNA ,URINE: NEGATIVE
N. GONORRHOEAE DNA, URINE: NEGATIVE

## 2020-04-20 NOTE — TELEPHONE ENCOUNTER
Dr. Aaron Walker patient    Patient called and needs a refill of triamcinolone (KENALOG) 0.1 % cream. Please send to [address], [place] [phone] Francisco Navarrete [phone]    Call back #[phone]

## 2020-04-21 RX ORDER — TRIAMCINOLONE ACETONIDE 1 MG/G
CREAM TOPICAL
Qty: 80 G | Refills: 1 | Status: SHIPPED | OUTPATIENT
Start: 2020-04-21 | End: 2020-11-12 | Stop reason: SDUPTHER

## 2020-11-12 ENCOUNTER — OFFICE VISIT (OUTPATIENT)
Dept: DERMATOLOGY | Age: 70
End: 2020-11-12
Payer: MEDICARE

## 2020-11-12 VITALS — TEMPERATURE: 98.2 F

## 2020-11-12 PROCEDURE — 3017F COLORECTAL CA SCREEN DOC REV: CPT | Performed by: DERMATOLOGY

## 2020-11-12 PROCEDURE — 11102 TANGNTL BX SKIN SINGLE LES: CPT | Performed by: DERMATOLOGY

## 2020-11-12 PROCEDURE — G8484 FLU IMMUNIZE NO ADMIN: HCPCS | Performed by: DERMATOLOGY

## 2020-11-12 PROCEDURE — 1123F ACP DISCUSS/DSCN MKR DOCD: CPT | Performed by: DERMATOLOGY

## 2020-11-12 PROCEDURE — 4040F PNEUMOC VAC/ADMIN/RCVD: CPT | Performed by: DERMATOLOGY

## 2020-11-12 PROCEDURE — 17110 DESTRUCTION B9 LES UP TO 14: CPT | Performed by: DERMATOLOGY

## 2020-11-12 PROCEDURE — 17003 DESTRUCT PREMALG LES 2-14: CPT | Performed by: DERMATOLOGY

## 2020-11-12 PROCEDURE — G8421 BMI NOT CALCULATED: HCPCS | Performed by: DERMATOLOGY

## 2020-11-12 PROCEDURE — 17000 DESTRUCT PREMALG LESION: CPT | Performed by: DERMATOLOGY

## 2020-11-12 PROCEDURE — G8427 DOCREV CUR MEDS BY ELIG CLIN: HCPCS | Performed by: DERMATOLOGY

## 2020-11-12 PROCEDURE — 1036F TOBACCO NON-USER: CPT | Performed by: DERMATOLOGY

## 2020-11-12 PROCEDURE — 99213 OFFICE O/P EST LOW 20 MIN: CPT | Performed by: DERMATOLOGY

## 2020-11-12 RX ORDER — TRIAMCINOLONE ACETONIDE 1 MG/G
CREAM TOPICAL
Qty: 160 G | Refills: 1 | Status: SHIPPED | OUTPATIENT
Start: 2020-11-12 | End: 2020-11-19 | Stop reason: SDUPTHER

## 2020-11-12 NOTE — PROGRESS NOTES
[facility]  Breezy Lopez MD  [phone]      Dustin Peñaloza  1950    79 y.o. male     Date of Visit: 11/12/2020    Chief Complaint: skin lesions and rash    History of Present Illness:    1. F/u for a hx of AKs - complains of few new lesions on the scalp. 2.  He complains of intensely pruritic lesions on the chest and back. 3.  He also complains of a pruritic eruption on the trunk and upper extremities. 4.  Has hx of superficial BCC of the right mid posterior lateral thigh-treated with curettage on 5/2/2018. Denies any signs of recurrence. 5.  Unknown duration of a dark lesion on the back. Review of Systems:  Skin: no other concerning lesions or growths. Past Medical History, Family History, Surgical History, Medications and Allergies reviewed.     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    Dyspnea on exertion 11/8/2010    Erectile dysfunction 11/8/2010    Essential hypertension 11/4/2015    Fatty liver 11/8/2010    Gastroesophageal reflux disease with esophagitis 5/27/2016    Gastroesophageal reflux disease without esophagitis 11/30/2015    GERD (gastroesophageal reflux disease) 11/8/2010    Heme positive stool 11/8/2010    Hyperglycemia 11/8/2010    Hyperlipidemia 11/8/2010    Hypertension 11/8/2010    Left ventricular hypertrophy 11/8/2010    Lumbar disc disease 1/3/2013    Lumbar disc herniation 11/8/2010    Lumbar facet arthropathy 1/3/2013    Lumbar radiculopathy 11/8/2010    Lumbar spinal stenosis 1/3/2013    Obesity 11/8/2010    Perennial allergic rhinitis 10/17/2016    Polycythemia     Retinal detachment 9/30/2013    Right eye - 2013    Testosterone deficiency 3/7/2013     Past Surgical History:   Procedure Laterality Date    BACK SURGERY  07/01/2017    CATARACT REMOVAL WITH IMPLANT Left November 11, 2008    Dr. Laurie Gandara    COLONOSCOPY  December 1, 2010    Dr. Emely Camarillo  02/13/2017    [address]      w/graft per PT (upper front area per PT)   [facility]  August 1, 2013    Dr. Gildardo Calhoun - right L3-L4 and L4-L5 - [address]  January 14, 2010    Dr. Brenton Bone Right November 2000    Dr. Brigette Miller - Hernesto Katrina Left March 2007    Dr. Zack Lam and 1980    in-grown toe nail    UPPER GASTROINTESTINAL ENDOSCOPY  August 3, 2012    Dr. Perfecto Sierra  May 2, 2016    Dr. Chairez Ran VITRECTOMY Left April 30, 2008    Dr. Dagoberto Cleary - trans pars plana vitrectomy, fluid-air exchange cryoretinopexy       Allergies   Allergen Reactions    Clindamycin/Lincomycin Nausea Only     Outpatient Medications Marked as Taking for the 11/12/20 encounter (Office Visit) with Hernan Obrien MD   Medication Sig Dispense Refill    triamcinolone (KENALOG) 0.1 % cream APPLY to itchy areas on the on the chest, abdomen, back and shoulders twice daily until improved. 160 g 1    DICLOFENAC PO Take by mouth      carvedilol (COREG) 12.5 MG tablet Take 1 tablet by mouth 2 times daily 180 tablet 3    montelukast (SINGULAIR) 10 MG tablet Take 1 tablet by mouth nightly 90 tablet 3    testosterone cypionate (DEPOTESTOTERONE CYPIONATE) 200 MG/ML injection Administer 1 ml (200 mg) intramuscularly every two weeks.  6 vial 2    fluticasone (FLONASE) 50 MCG/ACT nasal spray 2 sprays by Nasal route daily 1 Bottle 3    methocarbamol (ROBAXIN-750) 750 MG tablet Take 1 Assessment and Plan     1. AK (actinic keratosis) -     2 cycles of liquid nitrogen applied to 5 AKs: Crown of the scalp - 2, right antihelix - 1, left lower shin - 2. Patient was educated regarding the potential risks of blister formation, discomfort, hypopigmentation, and scar. Wound care was discussed. 2. Inflamed seborrheic keratosis     2 cycles of liquid nitrogen applied to 10 ISKs: Back - 6, left upper chest - 2, right abdomen - 2. Patient was educated regarding the potential risks of blister formation, discomfort, hypopigmentation, and scar. Wound care was discussed. 3. Irritant dermatitis of the trunk and shoulders -     Triamcinolone 0.1% cream twice daily for up to 2 weeks or until improved. 4. History of basal cell carcinoma - clear    Sun protective behaviors and self skin examinations were encouraged. Call for any new or concerning lesions. 5.  Atypical appearing nevus on the left upper back - dysplastic nevus vs early MM    Discussed possible diagnosis; patient agreeable to biopsy (verbal consent obtained). The area(s) to be biopsied were marked with a surgical pen. Alcohol was used to cleanse the site. Local anesthesia was acheived with 1% lidocaine with epinephrine. Shave biopsy was performed using a razor blade. Hemostasis was achieved with aluminum chloride. The wound(s) were dressed with petrolatum and covered with a bandage. Wound care instructions were reviewed. 1 Specimen (s) sent to pathology. The specimen bottles were appropriately labeled. We also reviewed the risks of bleeding, scar, and infection. Return in about 6 months (around 5/12/2021).     --Vibha Foley MD

## 2020-11-12 NOTE — PATIENT INSTRUCTIONS

## 2020-11-18 LAB — DERMATOLOGY PATHOLOGY REPORT: NORMAL

## 2020-11-19 RX ORDER — TRIAMCINOLONE ACETONIDE 1 MG/G
CREAM TOPICAL
Qty: 454 G | Refills: 1 | Status: SHIPPED | OUTPATIENT
Start: 2020-11-19 | End: 2021-05-11 | Stop reason: SDUPTHER

## 2020-11-19 NOTE — TELEPHONE ENCOUNTER
Patient called and said the 160g of triamcinolone cream isn't big enough for him. He is already almost out because of all the areas he has to apply it. He is asking for a big jar to be sent in for him. Please advise.

## 2021-05-11 ENCOUNTER — OFFICE VISIT (OUTPATIENT)
Dept: DERMATOLOGY | Age: 71
End: 2021-05-11
Payer: MEDICARE

## 2021-05-11 VITALS — TEMPERATURE: 97 F

## 2021-05-11 DIAGNOSIS — L24.9 IRRITANT DERMATITIS: ICD-10-CM

## 2021-05-11 DIAGNOSIS — L57.0 AK (ACTINIC KERATOSIS): Primary | ICD-10-CM

## 2021-05-11 DIAGNOSIS — L11.1 GROVER'S DISEASE: ICD-10-CM

## 2021-05-11 DIAGNOSIS — L82.0 INFLAMED SEBORRHEIC KERATOSIS: ICD-10-CM

## 2021-05-11 PROCEDURE — 17110 DESTRUCTION B9 LES UP TO 14: CPT | Performed by: DERMATOLOGY

## 2021-05-11 PROCEDURE — 99213 OFFICE O/P EST LOW 20 MIN: CPT | Performed by: DERMATOLOGY

## 2021-05-11 PROCEDURE — 17003 DESTRUCT PREMALG LES 2-14: CPT | Performed by: DERMATOLOGY

## 2021-05-11 PROCEDURE — 3017F COLORECTAL CA SCREEN DOC REV: CPT | Performed by: DERMATOLOGY

## 2021-05-11 PROCEDURE — G8427 DOCREV CUR MEDS BY ELIG CLIN: HCPCS | Performed by: DERMATOLOGY

## 2021-05-11 PROCEDURE — G8421 BMI NOT CALCULATED: HCPCS | Performed by: DERMATOLOGY

## 2021-05-11 PROCEDURE — 1123F ACP DISCUSS/DSCN MKR DOCD: CPT | Performed by: DERMATOLOGY

## 2021-05-11 PROCEDURE — 1036F TOBACCO NON-USER: CPT | Performed by: DERMATOLOGY

## 2021-05-11 PROCEDURE — 17000 DESTRUCT PREMALG LESION: CPT | Performed by: DERMATOLOGY

## 2021-05-11 PROCEDURE — 4040F PNEUMOC VAC/ADMIN/RCVD: CPT | Performed by: DERMATOLOGY

## 2021-05-11 RX ORDER — TRIAMCINOLONE ACETONIDE 1 MG/G
CREAM TOPICAL
Qty: 454 G | Refills: 1 | Status: SHIPPED | OUTPATIENT
Start: 2021-05-11 | End: 2021-07-06

## 2021-05-11 NOTE — PROGRESS NOTES
[facility]  Corey Henson MD  [phone]      Adriana Morales  1950    79 y.o. male     Date of Visit: 5/11/2021    Chief Complaint: skin lesions    History of Present Illness:    1. He complains of few persistent scaly lesions on the scalp and right lower shin. 2.  He also complains of itching on the back. Takes hot showers - helps back pain. Uses triamcinolone 0.1% cream with improvement. 3.  He also complains of few persistent intensely pruritic lesions on the back. 4.  He has a rash on the chest and abdomen as well. Derm Hx:    He has a history of a superficial BCC on the right mid posterior lateral thigh status post curettage and a half 2018. He has a history of mildly dysplastic nevus on the left upper back status post removal with biopsy on 11/12/2020. Review of Systems:  Skin: No new or changing moles. Past Medical History, Family History, Surgical History, Medications and Allergies reviewed.     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    Dyspnea on exertion 11/8/2010    Erectile dysfunction 11/8/2010    Essential hypertension 11/4/2015    Fatty liver 11/8/2010    Gastroesophageal reflux disease with esophagitis 5/27/2016    Gastroesophageal reflux disease without esophagitis 11/30/2015    GERD (gastroesophageal reflux disease) 11/8/2010    Heme positive stool 11/8/2010    Hyperglycemia 11/8/2010    Hyperlipidemia 11/8/2010    Hypertension 11/8/2010    Left ventricular hypertrophy 11/8/2010    Lumbar disc disease 1/3/2013    Lumbar disc herniation 11/8/2010    Lumbar facet arthropathy 1/3/2013    Lumbar every two weeks. 6 vial 2    fluticasone (FLONASE) 50 MCG/ACT nasal spray 2 sprays by Nasal route daily 1 Bottle 3    methocarbamol (ROBAXIN-750) 750 MG tablet Take 1 tablet by mouth 2 times daily 180 tablet 3    fluticasone (ARNUITY ELLIPTA) 200 MCG/ACT AEPB Inhale 1 puff into the lungs daily      irbesartan (AVAPRO) 150 MG tablet Take 1 tablet by mouth daily 90 tablet 2    guaiFENesin (MUCINEX) 600 MG extended release tablet Take 1 tablet by mouth 2 times daily 20 tablet 1    omeprazole (PRILOSEC) 40 MG delayed release capsule Take 1 capsule by mouth 2 times daily 180 capsule 3    fluticasone-vilanterol (BREO ELLIPTA) 100-25 MCG/INH AEPB inhaler Inhale 1 puff into the lungs daily 30 each 2    beclomethasone (QNASL) 80 MCG/ACT AERS nasal spray 1 spray by Each Nare route daily 1 Inhaler 2    Hypodermic Needles-Disposable (SAFETY-AB NEEDLE 18G) MISC Use once a month to administer testosterone 100 each 0    Syringe/Needle, Disp, (SYRINGE 3CC/22GX1\") 22G X 1\" 3 ML MISC Use once a month to administer testosterone 100 each 0    docusate sodium (COLACE) 100 MG capsule Take 100 mg by mouth 2 times daily      aspirin EC 81 MG EC tablet Take 1 tablet by mouth daily with food.  Multiple Vitamin (MULTIVITAMIN) capsule Take 1 capsule by mouth daily      cycloSPORINE (RESTASIS) 0.05 % ophthalmic emulsion Place 1 drop into both eyes 2 times daily. Physical Examination       The following were examined and determined to be normal: Psych/Neuro, Head/face, Conjunctivae/eyelids, Gums/teeth/lips, Neck, RUE, LUE, LLE and Nails/digits. The following were examined and determined to be abnormal: Scalp/hair, Breast/axilla/chest, Abdomen, Back and RLE. Well appearing. 1.  Left crown of the scalp - 1 keratotic pink macule. Right lower shin - 1 keratotic pink papule. 2.  Back with ill defined scaly erythematous patches and papules.       3.  Right upper back - 1, right lower back - 1, left upper back - 1, left lower temporal scalp - 1: several stuck on appearing verrucous erythematous papules and plaques. 4. Lower chest and abdomen with scaly pink macules and papules. Assessment and Plan     1. AK (actinic keratosis) -     2 cycles of liquid nitrogen applied to 2 AKs: left crown of the scalp and right lower shin. Patient was educated regarding the potential risks of blister formation and discomfort. Wound care was discussed. 2. Irritant dermatitis of the back -     Triamcinolone 0.1% cream twice daily for up to 2 weeks or until improved. 3. Inflamed seborrheic keratosis - several    2 cycles of liquid nitrogen applied to 4 ISKs: Right upper back - 1, right lower back - 1, left upper back - 1, left lower temporal scalp - 1. Patient was educated regarding the potential risks of blister formation and discomfort. Wound care was discussed. 4. Vaibhav's disease -     Triamcinolone 0.1% cream twice daily for up to 2 weeks or until improved. Return in about 6 months (around 11/11/2021).     --Hi Alonzo MD

## 2021-07-06 ENCOUNTER — TELEPHONE (OUTPATIENT)
Dept: DERMATOLOGY | Age: 71
End: 2021-07-06

## 2021-07-06 RX ORDER — TRIAMCINOLONE ACETONIDE 1 MG/G
CREAM TOPICAL
Qty: 454 G | Refills: 2 | Status: SHIPPED | OUTPATIENT
Start: 2021-07-06 | End: 2021-11-17 | Stop reason: SDUPTHER

## 2021-07-06 RX ORDER — TRIAMCINOLONE ACETONIDE 1 MG/G
CREAM TOPICAL
Qty: 454 G | Refills: 0 | Status: SHIPPED | OUTPATIENT
Start: 2021-07-06 | End: 2021-07-06 | Stop reason: SDUPTHER

## 2021-07-06 NOTE — TELEPHONE ENCOUNTER
Dr Viji Quinones patient  Pt c/b #[phone]  Pt stated  Calling to speak with Alvaro Carias. Pt says he wanted to know if dr Viji Quinones can send a script to his [facility] pharmacy for triamcinolone (KENALOG) 0.1 % cream  Pt says he uses that all over his body and since it takes a lot he wanted to know if dr Viji Quinones can send 2 refills plus the one he already gets, so he want have to call back.

## 2021-07-12 ENCOUNTER — TELEPHONE (OUTPATIENT)
Dept: DERMATOLOGY | Age: 71
End: 2021-07-12

## 2021-07-12 NOTE — TELEPHONE ENCOUNTER
Patient is requesting two refills be added to prescription sent to [facility] 7/6 for triamcinolone. Please advise. Thank you!

## 2021-07-12 NOTE — TELEPHONE ENCOUNTER
Called and spoke to patient and informed him that on 7/6/21 a rx was sent in for triamcinolone cream with 2 refills. Patient verbalized understanding.

## 2021-11-17 ENCOUNTER — OFFICE VISIT (OUTPATIENT)
Dept: DERMATOLOGY | Age: 71
End: 2021-11-17
Payer: MEDICARE

## 2021-11-17 VITALS — TEMPERATURE: 97.8 F

## 2021-11-17 DIAGNOSIS — L82.1 SK (SEBORRHEIC KERATOSIS): ICD-10-CM

## 2021-11-17 DIAGNOSIS — L82.0 INFLAMED SEBORRHEIC KERATOSIS: Primary | ICD-10-CM

## 2021-11-17 DIAGNOSIS — L57.0 ACTINIC KERATOSIS: ICD-10-CM

## 2021-11-17 DIAGNOSIS — L30.9 DERMATITIS: ICD-10-CM

## 2021-11-17 DIAGNOSIS — D48.5 NEOPLASM OF UNCERTAIN BEHAVIOR OF SKIN: ICD-10-CM

## 2021-11-17 PROCEDURE — 17110 DESTRUCTION B9 LES UP TO 14: CPT | Performed by: DERMATOLOGY

## 2021-11-17 PROCEDURE — 17000 DESTRUCT PREMALG LESION: CPT | Performed by: DERMATOLOGY

## 2021-11-17 PROCEDURE — 11102 TANGNTL BX SKIN SINGLE LES: CPT | Performed by: DERMATOLOGY

## 2021-11-17 PROCEDURE — 1036F TOBACCO NON-USER: CPT | Performed by: DERMATOLOGY

## 2021-11-17 PROCEDURE — 3017F COLORECTAL CA SCREEN DOC REV: CPT | Performed by: DERMATOLOGY

## 2021-11-17 PROCEDURE — G8421 BMI NOT CALCULATED: HCPCS | Performed by: DERMATOLOGY

## 2021-11-17 PROCEDURE — G8484 FLU IMMUNIZE NO ADMIN: HCPCS | Performed by: DERMATOLOGY

## 2021-11-17 PROCEDURE — 99213 OFFICE O/P EST LOW 20 MIN: CPT | Performed by: DERMATOLOGY

## 2021-11-17 PROCEDURE — 17003 DESTRUCT PREMALG LES 2-14: CPT | Performed by: DERMATOLOGY

## 2021-11-17 PROCEDURE — 1123F ACP DISCUSS/DSCN MKR DOCD: CPT | Performed by: DERMATOLOGY

## 2021-11-17 PROCEDURE — G8427 DOCREV CUR MEDS BY ELIG CLIN: HCPCS | Performed by: DERMATOLOGY

## 2021-11-17 PROCEDURE — 4040F PNEUMOC VAC/ADMIN/RCVD: CPT | Performed by: DERMATOLOGY

## 2021-11-17 RX ORDER — TRIAMCINOLONE ACETONIDE 1 MG/G
CREAM TOPICAL
Qty: 454 G | Refills: 2 | Status: SHIPPED | OUTPATIENT
Start: 2021-11-17 | End: 2022-05-11 | Stop reason: SDUPTHER

## 2021-11-17 NOTE — PROGRESS NOTES
Former[facility]  Geo Longoria MD  [phone]      Sadie eMjía  1950    70 y.o. male     Date of Visit: 11/17/2021    Chief Complaint: skin lesions    History of Present Illness:    1. He complains of several intensely pruritic lesions on the upper extremities and back. 2.  He also complains of several growths on the scalp. 3.  He has a history of dermatitis on the trunk and extremities-reports good control with intermittent use of triamcinolone 0.1% cream.    4.  Unknown duration of an atypical appearing pigmented lesion on the left lower back. 5.  He has a couple of persistent scaly lesions on the scalp. Derm Hx:     He has a history of a superficial BCC on the right mid posterior lateral thigh status post curettage and a half 2018.     He has a history of mildly dysplastic nevus on the left upper back status post removal with biopsy on 11/12/2020. Review of Systems:  Gen: Feels well, good sense of health. Skin: No new or changing moles. .    Past Medical History, Family History, Surgical History, Medications and Allergies reviewed.     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    Dyspnea on exertion 11/8/2010    Erectile dysfunction 11/8/2010    Essential hypertension 11/4/2015    Fatty liver 11/8/2010    Gastroesophageal reflux disease with esophagitis 5/27/2016    Gastroesophageal reflux disease without esophagitis 11/30/2015    GERD (gastroesophageal reflux disease) 11/8/2010    Heme positive stool 11/8/2010    Hyperglycemia 11/8/2010    Hyperlipidemia 11/8/2010    Hypertension 11/8/2010    Left ventricular hypertrophy 11/8/2010    Lumbar disc disease 1/3/2013    Lumbar disc herniation 11/8/2010    Lumbar facet arthropathy 1/3/2013    Lumbar radiculopathy 11/8/2010    Lumbar spinal stenosis 1/3/2013    Obesity 11/8/2010    Perennial allergic rhinitis 10/17/2016    Polycythemia     Retinal detachment 9/30/2013    Right eye - 2013    Testosterone deficiency 3/7/2013     Past Surgical History:   Procedure Laterality Date    BACK SURGERY  07/01/2017    CATARACT REMOVAL WITH IMPLANT Left November 11, 2008    Dr. El Champion  December 1, 2010    Dr. Renaldo Medrano  02/13/2017    [address]      w/graft per PT (upper front area per PT)   [address] [facility]  August 1, 2013    Dr. Canseco [facility] - right L3-L4 and L4-L5 - [address] [facility]  January 14, 2010    Dr. Aneta Juarez Right November 2000    [facility] Carlie Martin [facility] Left March 2007    Dr. Chandrakant Schaeffer and 1980    in-grown toe nail    UPPER GASTROINTESTINAL ENDOSCOPY  August 3, 2012    Dr. Chase Kam  May 2, 2016    Dr. Jose Thomas VITRECTOMY Left April 30, 2008    Dr. Orellana Orn - trans pars plana vitrectomy, fluid-air exchange cryoretinopexy       Allergies   Allergen Reactions    Clindamycin/Lincomycin Nausea Only     Outpatient Medications Marked as Taking for the 11/17/21 encounter (Office Visit) with Rodrick Cha MD   Medication Sig Dispense Refill    triamcinolone (KENALOG) 0.1 % cream APPLY TO ITCHY AREAS ON THE CHEST, ABDOMEN, BACK AND SHOULDERS TWICE DAILY UNTIL IMPROVED 454 g 2    DICLOFENAC PO Take by mouth      carvedilol (COREG) 12.5 MG tablet Take 1 tablet by mouth 2 times daily 180 tablet 3    montelukast (SINGULAIR) 10 MG tablet Take 1 tablet by mouth nightly 90 tablet 3    testosterone cypionate (DEPOTESTOTERONE CYPIONATE) 200 MG/ML injection Administer 1 ml (200 mg) intramuscularly every two weeks. 6 vial 2    fluticasone (FLONASE) 50 MCG/ACT nasal spray 2 sprays by Nasal route daily 1 Bottle 3    methocarbamol (ROBAXIN-750) 750 MG tablet Take 1 tablet by mouth 2 times daily 180 tablet 3    fluticasone (ARNUITY ELLIPTA) 200 MCG/ACT AEPB Inhale 1 puff into the lungs daily      irbesartan (AVAPRO) 150 MG tablet Take 1 tablet by mouth daily 90 tablet 2    guaiFENesin (MUCINEX) 600 MG extended release tablet Take 1 tablet by mouth 2 times daily 20 tablet 1    omeprazole (PRILOSEC) 40 MG delayed release capsule Take 1 capsule by mouth 2 times daily 180 capsule 3    fluticasone-vilanterol (BREO ELLIPTA) 100-25 MCG/INH AEPB inhaler Inhale 1 puff into the lungs daily 30 each 2    beclomethasone (QNASL) 80 MCG/ACT AERS nasal spray 1 spray by Each Nare route daily 1 Inhaler 2    Hypodermic Needles-Disposable (SAFETY-AB NEEDLE 18G) MISC Use once a month to administer testosterone 100 each 0    Syringe/Needle, Disp, (SYRINGE 3CC/22GX1\") 22G X 1\" 3 ML MISC Use once a month to administer testosterone 100 each 0    docusate sodium (COLACE) 100 MG capsule Take 100 mg by mouth 2 times daily      aspirin EC 81 MG EC tablet Take 1 tablet by mouth daily with food.  Multiple Vitamin (MULTIVITAMIN) capsule Take 1 capsule by mouth daily      cycloSPORINE (RESTASIS) 0.05 % ophthalmic emulsion Place 1 drop into both eyes 2 times daily. Physical Examination       The following were examined and determined to be normal: Psych/Neuro, Head/face, Conjunctivae/eyelids, Gums/teeth/lips, Neck, Breast/axilla/chest and Abdomen. The following were examined and determined to be abnormal: Scalp/hair, Back, RUE, LUE, RLE and LLE. Well-appearing. 1.  Left forearm-1, back-6: Several crusted and verrucous pink papules.     2.  Scalp with several stuck on appearing verrucous gray and tan papules. 3.  Back and extremities with several scattered excoriations. 4.  Left lower back with a larger irregularly shaped smooth brown patch. 5.  Crown of the scalp with 3 keratotic erythematous macules. Assessment and Plan     1. Inflamed seborrheic keratosis     2 cycles of liquid nitrogen applied to 7 ISKs:  Left forearm-1, back-6. Patient was educated regarding the potential risks of blister formation and discomfort. Wound care was discussed. 2. SK (seborrheic keratosis)     Reassurance. 3. Dermatitis -mostly clear today with residual excoriations    Triamcinolone 0.1% cream twice daily as needed. Trial of Eucerin antiitch relief lotion as needed. Encouraged daily use of emollients. 4. Neoplasm of uncertain behavior of skin, left lower back-solar lentigo versus SK versus early melanoma    Discussed possible diagnosis; patient agreeable to biopsy (consent obtained). Risks reviewed including discomfort, bleeding, scar and infection. The area(s) to be biopsied were marked with a surgical pen. Alcohol was used to cleanse the site. Local anesthesia was acheived with 1% lidocaine with epinephrine. Shave biopsy was performed using a razor blade. Hemostasis was achieved with aluminum chloride. The wound(s) were dressed with petrolatum and covered with a bandage. Wound care instructions were reviewed. 1 Specimen (s) sent to pathology. The specimen bottles were appropriately labeled. 5. Actinic keratosis - few    2 cycles of liquid nitrogen applied to 3 AKs on the crown of the scalp. Patient was educated regarding the potential risks of blister formation and discomfort. Wound care was discussed. Return in about 6 months (around 5/17/2022).     --Cale Warren MD

## 2021-11-17 NOTE — PATIENT INSTRUCTIONS
Biopsy Wound Care Instructions    · Keep the bandage in place for 24 hours. · Cleanse the wound with mild soapy water daily   Gently dry the area.  Apply Vaseline or petroleum jelly to the wound using a cotton tipped applicator.  Cover with a clean bandage.  Repeat this process until the biopsy site is healed.  If you had stitches placed, continue treating the site until the stitches are removed. Remember to make an appointment to return to have your stitches removed by our staff.  You may shower and bathe as usual.       ** Biopsy results generally take around 7 business days to come back. If you have not heard from us by then, please call the office at [phone]. *Please note that biopsy results are released to both the patient and physician at the same time in [address] Please allow time for your physician to review the results. One of our staff members will reach out to you with the results and plan. Cryosurgery (Freezing) Wound Care Instructions    AFTER THE PROCEDURE:    You will notice swelling and redness around the site. This is normal.    You may experience a sharp or sore feeling for the next several days. For this discomfort, you may take acetaminophen (Tylenol©).  A blister may develop at the treated area, sometimes as soon as by the end of the day. After several days, the blister will subside and a scab will form.  If the area is bumped or traumatized during the first few days following freezing, you may develop bleeding into the blister, forming a blood blister. This is nothing to be alarmed about.  If the blister is tense, uncomfortable, or much larger than the site that was frozen, you may pop the blister along its edge with a sterile needle (boiled, heated under a flame, or cleaned with alcohol) to allow the fluid to drain out. If the blister does not bother you, no treatment is needed.  Do NOT peel off the top of the blister roof.  It will act as a dressing on top of your wound. WOUND CARE:    You may shower or bathe as usual, but avoid scrubbing the areas that have been frozen.  Cleanse the site twice a day with mild soapy water, and then apply a thin film of white petrolatum (Vaseline©).  You do not need to cover the area, but can if you prefer.  Do NOT allow the site to become dry or crusted, or attempt to dry it out with rubbing alcohol or hydrogen peroxide.  Continue this regimen until the area is pink and healed. Depending on the size and location of your cryosurgery site, healing may take 2 to 4 weeks.  The area may continue to be pink for several weeks, and over the next few months may become darker or lighter than the surrounding skin. This may be a permanent change.    

## 2021-11-18 ENCOUNTER — TELEPHONE (OUTPATIENT)
Dept: DERMATOLOGY | Age: 71
End: 2021-11-18

## 2021-11-18 NOTE — TELEPHONE ENCOUNTER
Called and spoke to patient's wife [facility] and advised her that patient is to use Triamcinolone cream on red areas and the anti itch Eucerin lotion on any pruritic areas. She verbalized understanding.

## 2021-11-22 LAB — DERMATOLOGY PATHOLOGY REPORT: NORMAL

## 2021-12-17 ENCOUNTER — TELEPHONE (OUTPATIENT)
Dept: DERMATOLOGY | Age: 71
End: 2021-12-17

## 2021-12-17 NOTE — TELEPHONE ENCOUNTER
Dr Lois Arnold patient  Pt c/b #[phone]  Pt stated  Has started noticing some spots all over his legs and with dead skin on top. Pt says dr Lois Arnold frozed one spot on the side of his face and that spot has an itch to it which leads all the way down to his jaw.

## 2022-01-11 ENCOUNTER — OFFICE VISIT (OUTPATIENT)
Dept: DERMATOLOGY | Age: 72
End: 2022-01-11
Payer: MEDICARE

## 2022-01-11 VITALS — TEMPERATURE: 97.5 F

## 2022-01-11 DIAGNOSIS — L82.0 INFLAMED SEBORRHEIC KERATOSIS: Primary | ICD-10-CM

## 2022-01-11 DIAGNOSIS — L57.0 AK (ACTINIC KERATOSIS): ICD-10-CM

## 2022-01-11 DIAGNOSIS — L30.0 NUMMULAR DERMATITIS: ICD-10-CM

## 2022-01-11 PROCEDURE — G8484 FLU IMMUNIZE NO ADMIN: HCPCS | Performed by: DERMATOLOGY

## 2022-01-11 PROCEDURE — 1036F TOBACCO NON-USER: CPT | Performed by: DERMATOLOGY

## 2022-01-11 PROCEDURE — 4040F PNEUMOC VAC/ADMIN/RCVD: CPT | Performed by: DERMATOLOGY

## 2022-01-11 PROCEDURE — 17000 DESTRUCT PREMALG LESION: CPT | Performed by: DERMATOLOGY

## 2022-01-11 PROCEDURE — G8421 BMI NOT CALCULATED: HCPCS | Performed by: DERMATOLOGY

## 2022-01-11 PROCEDURE — G8427 DOCREV CUR MEDS BY ELIG CLIN: HCPCS | Performed by: DERMATOLOGY

## 2022-01-11 PROCEDURE — 3017F COLORECTAL CA SCREEN DOC REV: CPT | Performed by: DERMATOLOGY

## 2022-01-11 PROCEDURE — 1123F ACP DISCUSS/DSCN MKR DOCD: CPT | Performed by: DERMATOLOGY

## 2022-01-11 PROCEDURE — 17111 DESTRUCTION B9 LESIONS 15/>: CPT | Performed by: DERMATOLOGY

## 2022-01-11 PROCEDURE — 99213 OFFICE O/P EST LOW 20 MIN: CPT | Performed by: DERMATOLOGY

## 2022-01-11 RX ORDER — MOMETASONE FUROATE 1 MG/G
OINTMENT TOPICAL
Qty: 45 G | Refills: 2 | Status: SHIPPED | OUTPATIENT
Start: 2022-01-11 | End: 2022-05-25 | Stop reason: SDUPTHER

## 2022-01-11 NOTE — PROGRESS NOTES
spinal stenosis 1/3/2013    Obesity 11/8/2010    Perennial allergic rhinitis 10/17/2016    Polycythemia     Retinal detachment 9/30/2013    Right eye - 2013    Testosterone deficiency 3/7/2013     Past Surgical History:   Procedure Laterality Date    BACK SURGERY  07/01/2017    CATARACT REMOVAL WITH IMPLANT Left November 11, 2008    Dr. Aguilar Silence    COLONOSCOPY  December 1, 2010    Dr. Cassia Mcclellan  02/13/2017    [address]      w/graft per PT (upper front area per PT)   [address] [facility]  August 1, 2013    Dr. Aminah Triplett - right L3-L4 and L4-L5 - [address] [facility]  January 14, 2010    Dr. Trevin Rice Right November 2000    Dr. Rachelle Maciel Left March 2007    Dr. Russel Haskins and 1980    in-grown toe nail    UPPER GASTROINTESTINAL ENDOSCOPY  August 3, 2012    Dr. Katina Crook  May 2, 2016    Dr. Holly Evans VITRECTOMY Left April 30, 2008    Dr. Joshua Hernandez - trans pars plana vitrectomy, fluid-air exchange cryoretinopexy       Allergies   Allergen Reactions    Clindamycin/Lincomycin Nausea Only     Outpatient Medications Marked as Taking for the 1/11/22 encounter (Office Visit) with Kate Hayes MD   Medication Sig Dispense Refill    mometasone (ELOCON) 0.1 % ointment Apply to affected to the lesions on the shins twice daily for up 2 weeks or until improved.  45 g 2    triamcinolone (KENALOG) 0.1 % cream APPLY TO ITCHY AREAS ON THE CHEST, ABDOMEN, BACK AND SHOULDERS TWICE DAILY UNTIL IMPROVED 454 g 2    carvedilol (COREG) 12.5 MG tablet Take 1 tablet by mouth 2 times daily 180 tablet 3    montelukast (SINGULAIR) 10 MG tablet Take 1 tablet by mouth nightly 90 tablet 3    fluticasone (FLONASE) 50 MCG/ACT nasal spray 2 sprays by Nasal route daily 1 Bottle 3    methocarbamol (ROBAXIN-750) 750 MG tablet Take 1 tablet by mouth 2 times daily 180 tablet 3    fluticasone (ARNUITY ELLIPTA) 200 MCG/ACT AEPB Inhale 1 puff into the lungs daily      irbesartan (AVAPRO) 150 MG tablet Take 1 tablet by mouth daily 90 tablet 2    guaiFENesin (MUCINEX) 600 MG extended release tablet Take 1 tablet by mouth 2 times daily 20 tablet 1    omeprazole (PRILOSEC) 40 MG delayed release capsule Take 1 capsule by mouth 2 times daily 180 capsule 3    fluticasone-vilanterol (BREO ELLIPTA) 100-25 MCG/INH AEPB inhaler Inhale 1 puff into the lungs daily 30 each 2    beclomethasone (QNASL) 80 MCG/ACT AERS nasal spray 1 spray by Each Nare route daily 1 Inhaler 2    Hypodermic Needles-Disposable (SAFETY-AB NEEDLE 18G) MISC Use once a month to administer testosterone 100 each 0    Syringe/Needle, Disp, (SYRINGE 3CC/22GX1\") 22G X 1\" 3 ML MISC Use once a month to administer testosterone 100 each 0    docusate sodium (COLACE) 100 MG capsule Take 100 mg by mouth 2 times daily      aspirin EC 81 MG EC tablet Take 1 tablet by mouth daily with food.  Multiple Vitamin (MULTIVITAMIN) capsule Take 1 capsule by mouth daily      cycloSPORINE (RESTASIS) 0.05 % ophthalmic emulsion Place 1 drop into both eyes 2 times daily. Physical Examination       The following were examined and determined to be normal: Psych/Neuro, Conjunctivae/eyelids, Gums/teeth/lips, Neck, Breast/axilla/chest, Abdomen, RUE and LUE. The following were examined and determined to be abnormal: Scalp/hair, Head/face, Back, RLE and LLE. Well appearing. 1.  Back - 10, scalp - 5: multiple crusted verrucous pink tan papules with crusting. 2.  Left upper forehead - 1 scaly pink macule. 3.  Each shin with a round crusted erythematous patch. Assessment and Plan     1. Inflamed seborrheic keratosis -     2 cycles of liquid nitrogen applied to 15 ISKs: Back - 10, scalp - 5. Patient was educated regarding the potential risks of blister formation and discomfort. Wound care was discussed. 2. AK (actinic keratosis)     2 cycles of liquid nitrogen applied to 1 AK on the left upper forehead. Patient was educated regarding the potential risks of blister formation and discomfort. Wound care was discussed. 3. Nummular dermatitis - 2 persistent areas on the legs    Mometasone ointment twice daily until improved. Return in about 3 months (around 4/11/2022).     --Keesha Chavez MD

## 2022-02-08 ENCOUNTER — HOSPITAL ENCOUNTER (OUTPATIENT)
Dept: MRI IMAGING | Age: 72
Discharge: HOME | End: 2022-02-08
Payer: MEDICARE

## 2022-02-08 DIAGNOSIS — M51.36 LUMBAR DEGENERATIVE DISC DISEASE: ICD-10-CM

## 2022-02-08 PROCEDURE — 72148 MRI LUMBAR SPINE W/O DYE: CPT

## 2022-05-11 ENCOUNTER — OFFICE VISIT (OUTPATIENT)
Dept: DERMATOLOGY | Age: 72
End: 2022-05-11
Payer: MEDICARE

## 2022-05-11 VITALS — TEMPERATURE: 98 F

## 2022-05-11 DIAGNOSIS — R21 RASH: ICD-10-CM

## 2022-05-11 DIAGNOSIS — L82.1 SK (SEBORRHEIC KERATOSIS): ICD-10-CM

## 2022-05-11 DIAGNOSIS — L81.4 SOLAR LENTIGINOSIS: ICD-10-CM

## 2022-05-11 DIAGNOSIS — D48.5 NEOPLASM OF UNCERTAIN BEHAVIOR OF SKIN: Primary | ICD-10-CM

## 2022-05-11 PROCEDURE — G8427 DOCREV CUR MEDS BY ELIG CLIN: HCPCS | Performed by: DERMATOLOGY

## 2022-05-11 PROCEDURE — 11103 TANGNTL BX SKIN EA SEP/ADDL: CPT | Performed by: DERMATOLOGY

## 2022-05-11 PROCEDURE — 1123F ACP DISCUSS/DSCN MKR DOCD: CPT | Performed by: DERMATOLOGY

## 2022-05-11 PROCEDURE — 4040F PNEUMOC VAC/ADMIN/RCVD: CPT | Performed by: DERMATOLOGY

## 2022-05-11 PROCEDURE — 11102 TANGNTL BX SKIN SINGLE LES: CPT | Performed by: DERMATOLOGY

## 2022-05-11 PROCEDURE — 1036F TOBACCO NON-USER: CPT | Performed by: DERMATOLOGY

## 2022-05-11 PROCEDURE — 99212 OFFICE O/P EST SF 10 MIN: CPT | Performed by: DERMATOLOGY

## 2022-05-11 PROCEDURE — 3017F COLORECTAL CA SCREEN DOC REV: CPT | Performed by: DERMATOLOGY

## 2022-05-11 PROCEDURE — G8421 BMI NOT CALCULATED: HCPCS | Performed by: DERMATOLOGY

## 2022-05-11 RX ORDER — TRIAMCINOLONE ACETONIDE 1 MG/G
CREAM TOPICAL
Qty: 454 G | Refills: 2 | Status: SHIPPED | OUTPATIENT
Start: 2022-05-11 | End: 2022-05-25 | Stop reason: SDUPTHER

## 2022-05-11 NOTE — PROGRESS NOTES
[facility]  Coreyang Das MD  [phone]      Nery Law  1950    70 y.o. male     Date of Visit: 5/11/2022    Chief Complaint: rash    History of Present Illness:    1. Unknown duration of an atypical appearing pigmented lesion on the left upper back. 2.  He complains of a persistent minimally pruritic eruption on the left leg. Did not improve with mometasone ointment. 3.  He complains of few growths on the chest and upper extremities. 4.  He has numerous freckles. Derm Hx:     He has a history of a superficial BCC on the right mid posterior lateral thigh status post curettage and a half 2018.     He has a history of mildly dysplastic nevus on the left upper back status post removal with biopsy on 11/12/2020. Review of Systems:  Gen: Feels well, good sense of health. Past Medical History, Family History, Surgical History, Medications and Allergies reviewed.     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    Dyspnea on exertion 11/8/2010    Erectile dysfunction 11/8/2010    Essential hypertension 11/4/2015    Fatty liver 11/8/2010    Gastroesophageal reflux disease with esophagitis 5/27/2016    Gastroesophageal reflux disease without esophagitis 11/30/2015    GERD (gastroesophageal reflux disease) 11/8/2010    Heme positive stool 11/8/2010    Hyperglycemia 11/8/2010    Hyperlipidemia 11/8/2010    Hypertension 11/8/2010    Left ventricular hypertrophy 11/8/2010    Lumbar disc disease 1/3/2013    Lumbar disc herniation 11/8/2010    Lumbar facet arthropathy 1/3/2013    Lumbar radiculopathy 11/8/2010    Lumbar spinal stenosis 1/3/2013    Obesity 11/8/2010    Perennial allergic rhinitis 10/17/2016    Polycythemia     Retinal detachment 9/30/2013    Right eye - 2013    Testosterone deficiency 3/7/2013     Past Surgical History:   Procedure Laterality Date    BACK SURGERY  07/01/2017    CATARACT REMOVAL WITH IMPLANT Left November 11, 2008    Dr. Doug Ganser    COLONOSCOPY  December 1, 2010    Dr. Donnie Warren  02/13/2017    [address]      w/graft per PT (upper front area per PT)   [address] [facility]  August 1, 2013    Dr. Leonid Hoang - right L3-L4 and L4-L5 - [address] [facility]  January 14, 2010    Dr. Rosamaria Boudreaux Right November 2000    Dr. Vadim Vázquez Left March 2007    Dr. Corazon Salinas and 1980    in-grown toe nail    UPPER GASTROINTESTINAL ENDOSCOPY  August 3, 2012    Dr. Esperanza De La Rosa  May 2, 2016    Dr. Rose Legacy VITRECTOMY Left April 30, 2008    Dr. Krish Patel - trans pars plana vitrectomy, fluid-air exchange cryoretinopexy       Allergies   Allergen Reactions    Clindamycin/Lincomycin Nausea Only     Outpatient Medications Marked as Taking for the 5/11/22 encounter (Office Visit) with Pat Looney MD   Medication Sig Dispense Refill    mometasone (ELOCON) 0.1 % ointment Apply to affected to the lesions on the shins twice daily for up 2 weeks or until improved.  45 g 2    triamcinolone (KENALOG) 0.1 % cream APPLY TO ITCHY AREAS ON THE CHEST, ABDOMEN, BACK AND SHOULDERS TWICE DAILY UNTIL IMPROVED 454 g 2    carvedilol (COREG) 12.5 MG tablet Take 1 tablet by mouth 2 times daily 180 tablet 3    montelukast (SINGULAIR) 10 MG tablet Take 1 tablet by mouth nightly 90 tablet 3    fluticasone (FLONASE) 50 MCG/ACT nasal spray 2 sprays by Nasal route daily 1 Bottle 3    methocarbamol (ROBAXIN-750) 750 MG tablet Take 1 tablet by mouth 2 times daily 180 tablet 3    fluticasone (ARNUITY ELLIPTA) 200 MCG/ACT AEPB Inhale 1 puff into the lungs daily      irbesartan (AVAPRO) 150 MG tablet Take 1 tablet by mouth daily 90 tablet 2    guaiFENesin (MUCINEX) 600 MG extended release tablet Take 1 tablet by mouth 2 times daily 20 tablet 1    omeprazole (PRILOSEC) 40 MG delayed release capsule Take 1 capsule by mouth 2 times daily 180 capsule 3    fluticasone-vilanterol (BREO ELLIPTA) 100-25 MCG/INH AEPB inhaler Inhale 1 puff into the lungs daily 30 each 2    beclomethasone (QNASL) 80 MCG/ACT AERS nasal spray 1 spray by Each Nare route daily 1 Inhaler 2    Hypodermic Needles-Disposable (SAFETY-AB NEEDLE 18G) MISC Use once a month to administer testosterone 100 each 0    Syringe/Needle, Disp, (SYRINGE 3CC/22GX1\") 22G X 1\" 3 ML MISC Use once a month to administer testosterone 100 each 0    docusate sodium (COLACE) 100 MG capsule Take 100 mg by mouth 2 times daily      aspirin EC 81 MG EC tablet Take 1 tablet by mouth daily with food.  Multiple Vitamin (MULTIVITAMIN) capsule Take 1 capsule by mouth daily      cycloSPORINE (RESTASIS) 0.05 % ophthalmic emulsion Place 1 drop into both eyes 2 times daily. Physical Examination       The following were examined and determined to be normal: Psych/Neuro, Scalp/hair, Head/face, Conjunctivae/eyelids, Gums/teeth/lips, Neck, Breast/axilla/chest, Abdomen, RUE, LUE, RLE and Nails/digits. The following were examined and determined to be abnormal: Back and LLE. Well appearing. 1.  Left mid upper back - 1 cm ill defined variegated brown patch. 2.  Left leg with scaly and crusted erythematous patches. 3.  Trunk and upper extremities stuck-on appearing tan-brown verrucous papules. 4.  Trunk and extremities with numerous smooth light brown macules and patches.         Assessment and Plan     1. Neoplasm of uncertain behavior of skin, left upper back - solar lentigo vs lentigo maligna    Discussed possible diagnosis; patient agreeable to proceed with biopsy (written consent obtained). Risks of the procedure were reviewed including discomfort, bleeding, scar and infection. The area(s) to be biopsied were marked with a surgical pen. Alcohol was used to cleanse the site. Local anesthesia was acheived with 1% lidocaine with epinephrine. Shave biopsy was performed using a razor blade. Hemostasis was achieved with aluminum chloride. The wound(s) were dressed with petrolatum and covered with a bandage. Wound care instructions were reviewed. 1 Specimen (s) sent to pathology. The specimen bottles were appropriately labeled. The patient tolerated the procedure well and there were no immediate complications. 2. Rash - dermatitis vs dermatophytosis    Discussed possible diagnosis; patient agreeable to proceed with biopsy (written consent obtained). Risks of the procedure were reviewed including discomfort, bleeding, scar and infection. The area(s) to be biopsied were marked with a surgical pen. Alcohol was used to cleanse the site. Local anesthesia was acheived with 1% lidocaine with epinephrine. Shave biopsy was performed using a razor blade. Hemostasis was achieved with aluminum chloride. The wound(s) were dressed with petrolatum and covered with a bandage. Wound care instructions were reviewed. 1 Specimen (s) sent to pathology. The specimen bottles were appropriately labeled. The patient tolerated the procedure well and there were no immediate complications. 3. SK (seborrheic keratosis)     Reassurance. 4. Solar lentiginosis     Monitor for change. Return in about 6 months (around 11/11/2022).     --Pat Looney MD

## 2022-05-11 NOTE — PATIENT INSTRUCTIONS
Biopsy Wound Care Instructions    · Keep the bandage in place for 24 hours. · Cleanse the wound with mild soapy water daily   Gently dry the area.  Apply Vaseline or petroleum jelly to the wound using a cotton tipped applicator.  Cover with a clean bandage.  Repeat this process until the biopsy site is healed.  If you had stitches placed, continue treating the site until the stitches are removed. Remember to make an appointment to return to have your stitches removed by our staff.  You may shower and bathe as usual.       ** Biopsy results generally take around 7 business days to come back. If you have not heard from us by then, please call the office at [phone]. *Please note that biopsy results are released to both the patient and physician at the same time in [address] Please allow time for your physician to review the results. One of our staff members will reach out to you with the results and plan.

## 2022-05-16 ENCOUNTER — TELEPHONE (OUTPATIENT)
Dept: SURGERY | Age: 72
End: 2022-05-16

## 2022-05-16 DIAGNOSIS — D03.59 MELANOMA IN SITU OF BACK (HCC): Primary | ICD-10-CM

## 2022-05-16 LAB — DERMATOLOGY PATHOLOGY REPORT: ABNORMAL

## 2022-05-16 RX ORDER — CLOBETASOL PROPIONATE 0.5 MG/G
CREAM TOPICAL
Qty: 60 G | Refills: 1 | Status: SHIPPED | OUTPATIENT
Start: 2022-05-16 | End: 2022-05-25 | Stop reason: SDUPTHER

## 2022-05-16 NOTE — TELEPHONE ENCOUNTER
I'd like to know when I can schedule this Staged excision. 6/6 was a thought but not sure when to schedule the repair. 6/13 pt has something they have been waiting to do for months and really can't reschedule. Is 6/20 ok, hoping pt clears that week. (btw, on 6/8 at 2 pm, there's an excision that has been scheduled for months), not sure if this needs moved to accommodate possible repair for this patient. Please advise.

## 2022-05-25 RX ORDER — TRIAMCINOLONE ACETONIDE 1 MG/G
CREAM TOPICAL
Qty: 454 G | Refills: 2 | Status: SHIPPED | OUTPATIENT
Start: 2022-05-25

## 2022-05-25 RX ORDER — CLOBETASOL PROPIONATE 0.5 MG/G
CREAM TOPICAL
Qty: 60 G | Refills: 1 | Status: SHIPPED | OUTPATIENT
Start: 2022-05-25 | End: 2022-05-31 | Stop reason: SDUPTHER

## 2022-05-25 RX ORDER — MOMETASONE FUROATE 1 MG/G
OINTMENT TOPICAL
Qty: 45 G | Refills: 2 | Status: SHIPPED | OUTPATIENT
Start: 2022-05-25

## 2022-05-31 ENCOUNTER — TELEPHONE (OUTPATIENT)
Dept: DERMATOLOGY | Age: 72
End: 2022-05-31

## 2022-05-31 RX ORDER — CLOBETASOL PROPIONATE 0.5 MG/G
CREAM TOPICAL
Qty: 60 G | Refills: 1 | Status: SHIPPED | OUTPATIENT
Start: 2022-05-31 | End: 2022-08-16

## 2022-05-31 NOTE — TELEPHONE ENCOUNTER
If it is almost cleared up, let's keep going for another week or 2 with the clobetasol cream and then he can use it 1-2 times weekly thereafter if needed.

## 2022-05-31 NOTE — TELEPHONE ENCOUNTER
Called and spoke to patient and he has been using the Clobetasol cream on the psoriasis patch on the L shin for the past two weeks. It is almost gone but not fully clear yet. He is almost out of the clobetasol cream and is wondering if you want him to continue using it and for how long. If he needs to continue using it, he needs a refill. Please advise.

## 2022-05-31 NOTE — TELEPHONE ENCOUNTER
Pt was to call and report on how his spot on the back of his leg is doing. He says that the spot is almost gone. He needs to know if he needs to come back and be seen? Should he continue using the prescription that he was prescribed?  If so, he will need another tube called in.    Tosin Justin  Ph. [phone]  Fax [phone]

## 2022-06-06 NOTE — TELEPHONE ENCOUNTER
Pt stated he can come in in the PM on 6/13 but not in the morning due to being in [employer]. His repair date is scheduled for 6/15.

## 2022-06-13 ENCOUNTER — TELEPHONE (OUTPATIENT)
Dept: SURGERY | Age: 72
End: 2022-06-13

## 2022-06-13 NOTE — TELEPHONE ENCOUNTER
Pt tested positive for Covid yesterday. Pt has limited symptoms, mainly congestion in his head. Pt had lost of taste and 99.1 last evening. Pt will need rescheduled 10 days after positive.

## 2022-06-23 ENCOUNTER — TELEPHONE (OUTPATIENT)
Dept: SURGERY | Age: 72
End: 2022-06-23

## 2022-06-23 NOTE — TELEPHONE ENCOUNTER
The patient tested positive for Covid on 6/13 and had antibodies infused on 6/15/2022. He's rescheduled for staged excision on 7/12 and 7/15.

## 2022-07-07 ENCOUNTER — TELEPHONE (OUTPATIENT)
Dept: DERMATOLOGY | Age: 72
End: 2022-07-07

## 2022-07-07 NOTE — TELEPHONE ENCOUNTER
[phone]    Pt called concerning medicine for leg biopsy. Used for 2 weeks and not getting better. Would like to stop in on 7/12 after [facility] appt.      Thank You

## 2022-07-07 NOTE — TELEPHONE ENCOUNTER
Got a cancellation for 7/12/22 @ 4:30pm.  Called and spoke to patient's wife, Chavez Noguera and offered it and she accepted.

## 2022-07-07 NOTE — TELEPHONE ENCOUNTER
Called and spoke to patient and he states that he has been using the clobetasol cream off and on for the psoriasis on his leg ever since May. He stated that is does clear it up but it just comes back within a few days of stopping cream.   He is wondering if he can continuously use the clobetasol or would you advise against that. He also stated that he is also experiencing pruritis of the scalp and back is wondering what he can use for those areas. Lastly, patient states his biopsy site on leg from 5/11/22 is  and has not fully healed yet. I asked him if he could send a photo through [address] [place] [address] 95 but he said no. He said he will show it to Dr Dawood Curtis on 7/12 at his appointment. Patient said he would like to come in and asked if he could come in on 7/12/22 because he has Moh's at 8am and then is seeing Dr Karen Del Valle at 2. Advised patient that as of now we do not have any openings for that day and plus we don't know how long he'll be upstairs in Mohs. Offered patient appointment on 7/13 but he doesn't want drive back here the next day. I offered 7/14 but he declined because he has to see Dr Dawood Curtis again on 7/14 for  repair/staged excision. Please advise.

## 2022-07-12 ENCOUNTER — OFFICE VISIT (OUTPATIENT)
Dept: DERMATOLOGY | Age: 72
End: 2022-07-12
Payer: MEDICARE

## 2022-07-12 ENCOUNTER — TELEPHONE (OUTPATIENT)
Dept: SURGERY | Age: 72
End: 2022-07-12

## 2022-07-12 ENCOUNTER — PROCEDURE VISIT (OUTPATIENT)
Dept: SURGERY | Age: 72
End: 2022-07-12
Payer: MEDICARE

## 2022-07-12 VITALS — TEMPERATURE: 97.7 F | HEART RATE: 90 BPM | SYSTOLIC BLOOD PRESSURE: 110 MMHG | DIASTOLIC BLOOD PRESSURE: 77 MMHG

## 2022-07-12 DIAGNOSIS — L82.1 SK (SEBORRHEIC KERATOSIS): ICD-10-CM

## 2022-07-12 DIAGNOSIS — L40.9 PSORIASIS: ICD-10-CM

## 2022-07-12 DIAGNOSIS — D03.59 MELANOMA IN SITU OF BACK (HCC): Primary | ICD-10-CM

## 2022-07-12 DIAGNOSIS — L29.9 PRURITUS: Primary | ICD-10-CM

## 2022-07-12 PROCEDURE — 3017F COLORECTAL CA SCREEN DOC REV: CPT | Performed by: DERMATOLOGY

## 2022-07-12 PROCEDURE — 1036F TOBACCO NON-USER: CPT | Performed by: DERMATOLOGY

## 2022-07-12 PROCEDURE — 11604 EXC TR-EXT MAL+MARG 3.1-4 CM: CPT | Performed by: DERMATOLOGY

## 2022-07-12 PROCEDURE — G8427 DOCREV CUR MEDS BY ELIG CLIN: HCPCS | Performed by: DERMATOLOGY

## 2022-07-12 PROCEDURE — 99213 OFFICE O/P EST LOW 20 MIN: CPT | Performed by: DERMATOLOGY

## 2022-07-12 PROCEDURE — G8421 BMI NOT CALCULATED: HCPCS | Performed by: DERMATOLOGY

## 2022-07-12 PROCEDURE — 1123F ACP DISCUSS/DSCN MKR DOCD: CPT | Performed by: DERMATOLOGY

## 2022-07-12 RX ORDER — CALCIPOTRIENE 50 UG/G
CREAM TOPICAL
Qty: 60 G | Refills: 2 | Status: SHIPPED | OUTPATIENT
Start: 2022-07-12 | End: 2022-07-12 | Stop reason: SDUPTHER

## 2022-07-12 RX ORDER — CALCIPOTRIENE 50 UG/G
CREAM TOPICAL
Qty: 60 G | Refills: 2 | Status: SHIPPED | OUTPATIENT
Start: 2022-07-12

## 2022-07-12 RX ORDER — PANTOPRAZOLE SODIUM 40 MG/1
40 TABLET, DELAYED RELEASE ORAL DAILY
COMMUNITY

## 2022-07-12 NOTE — PROGRESS NOTES
PRE-PROCEDURE SCREENING    Pacemaker/ICD: No  Difficulty with numbing in the past: No  Local Anesthesia Reaction/passing out: No  Latex or adhesive allergy:  No  Bleeding/Clotting Disorders: No  Anticoagulant Therapy: Yes, baby asa per cardiology  Joint prosthesis: No  Artificial Heart Valve: No  Stroke or Seizures: No  Organ Transplant or Lymphoma: No  Immunosuppression: No  Respiratory Problems: Yes, mild asthma

## 2022-07-12 NOTE — PATIENT INSTRUCTIONS
[facility] Office Hours:    Monday-Thursday  7:30 AM-4:30 PM    Friday  9:00 AM-1:00 PM    After your Excision on your upper back    --Keep the area absolutely dry until you return to the surgery center for your repair or another layer depending on your pathology results. We will call you with those results. You should already have an appointment on our schedule to return. --We will call you tomorrow and ask you about your pain control and if you've had bleeding that has escaped your dressing. If a voicemail message has been left, you do not need to return the call unless you have a question. ? Bleeding: If bleeding occurs, DO NOT remove the bandage. Put firm pressure on the area with gauze for 20 minutes without peeking. If the bleeding continues, apply pressure for 20 minutes more. ? If the bleeding does not stop after you apply pressure, call us right away. If you cant call, go to the nearest emergency room or urgent care facility. POST-OPERATIVE INSTRUCTIONS     1. Activity: Do not lift anything heavier than a gallon of milk for 1 week. Also, avoid strenuous activity such as running, power walking or contact sports. 2.  Eating and drinking: Do not drink alcohol for 48 hours after your procedure. Alcohol increases the chances of bleeding. 3.  Medicines:  -If you have discomfort, take acetaminophen (Tylenol or Extra Strength Tylenol). Follow the instructions and warning on the bottle. -If your doctor has prescribed you an aspirin daily, please keep taking it. Do not take extra aspirin or medicines containing aspirin (such as Kristina-[place] and Excedrin) for 48 hours after your procedure. 4.  Symptoms: You may have these symptoms. They are normal and should get better with time:  A. Swelling. Swelling usually increases for 24 to 48 hours after your procedure and then begins to improve. B.  Some soreness and redness around your wound.   Vallery Dk which can last for up to 2

## 2022-07-12 NOTE — TELEPHONE ENCOUNTER
Spoke to Davion Mar at [facility] to notify lab of stat pickup for patient s/p staged excision on L upper back. Demographics given along with # of samples (5). Davion Mar stated the samples would be picked up today at usual time.

## 2022-07-12 NOTE — PROGRESS NOTES
[facility]  Angel Lang MD  [phone]      Moshe Sands  1950    70 y.o. male     Date of Visit: 7/12/2022    Chief Complaint: itching, psoriasis    History of Present Illness:    1. He complains of pruritus on the back. Some SKs treated with cryotherapy in the past with improvement in symptoms. 2.  Follow up for biopsy-proven psoriasis of the left leg. Melanoma in situ (lentigo maligna) on the left upper back - had first stage of slow Mohs done today. Review of Systems:  Gen: Feels well, good sense of health. Past Medical History, Family History, Surgical History, Medications and Allergies reviewed.     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/8/2010    Dyspnea on exertion 11/8/2010    Erectile dysfunction 11/8/2010    Essential hypertension 11/4/2015    Fatty liver 11/8/2010    Gastroesophageal reflux disease with esophagitis 5/27/2016    Gastroesophageal reflux disease without esophagitis 11/30/2015    GERD (gastroesophageal reflux disease) 11/8/2010    Heme positive stool 11/8/2010    Hyperglycemia 11/8/2010    Hyperlipidemia 11/8/2010    Hypertension 11/8/2010    Left ventricular hypertrophy 11/8/2010    Lumbar disc disease 1/3/2013    Lumbar disc herniation 11/8/2010    Lumbar facet arthropathy 1/3/2013    Lumbar radiculopathy 11/8/2010    Lumbar spinal stenosis 1/3/2013    Obesity 11/8/2010    Perennial allergic rhinitis 10/17/2016    Polycythemia     Retinal detachment 9/30/2013    Right eye - 2013    Testosterone deficiency 3/7/2013     Past Surgical History:   Procedure Laterality Date    BACK SURGERY  07/01/2017    CATARACT REMOVAL WITH IMPLANT Left November 11, 2008    Dr. Mary Baeza    COLONOSCOPY  December 1, 2010    Dr. Rosy Duron  02/13/2017    [address]      w/graft per PT (upper front area per PT)    EXCISION/BIOPSY Left 07/12/2022    Upper back for Melanoma in Counts include [facility] beds at the [facility]  August 1, 2013    Dr. Skinny Guerrier - right L3-L4 and L4-L5 - [address] [facility]  January 14, 2010    Dr. Tarik Rodriguez Right November 2000    Dr. Mariah Bai - Juliette Link Left March 2007    Dr. Kasie Carr and 1980    in-grown toe nail    UPPER GASTROINTESTINAL ENDOSCOPY  August 3, 2012    Dr. Walt Aguilar  May 2, 2016    Dr. Samir Davisper VITRECTOMY Left April 30, 2008    Dr. Pilo Garcia - trans pars plana vitrectomy, fluid-air exchange cryoretinopexy       Allergies   Allergen Reactions    Clindamycin/Lincomycin Nausea Only     Outpatient Medications Marked as Taking for the 7/12/22 encounter (Office Visit) with Andreina Mena MD   Medication Sig Dispense Refill    pantoprazole (PROTONIX) 40 MG tablet Take 40 mg by mouth daily      calcipotriene (DOVONEX) 0.005 % cream Apply to the legs twice daily for psoriasis. 60 g 2    clobetasol (TEMOVATE) 0.05 % cream Apply to affected areas on the left shin twice daily for up to 2 weeks or until improved. 60 g 1    triamcinolone (KENALOG) 0.1 % cream APPLY TO ITCHY AREAS ON THE CHEST, ABDOMEN, BACK AND SHOULDERS TWICE DAILY UNTIL IMPROVED 454 g 2    mometasone (ELOCON) 0.1 % ointment Apply to affected to the lesions on the shins twice daily for up 2 weeks or until improved.  45 g 2    carvedilol (COREG) 12.5 MG tablet Take 1 tablet by mouth 2 times daily 180 tablet 3    montelukast (SINGULAIR) 10 MG tablet Take 1 tablet by mouth nightly 90 tablet 3    fluticasone (FLONASE) 50 MCG/ACT nasal spray 2 sprays by Nasal route daily 1 Bottle 3    methocarbamol (ROBAXIN-750) 750 MG tablet Take 1 tablet by mouth 2 times daily 180 tablet 3    fluticasone (ARNUITY ELLIPTA) 200 MCG/ACT AEPB Inhale 1 puff into the lungs daily       irbesartan (AVAPRO) 150 MG tablet Take 1 tablet by mouth daily 90 tablet 2    guaiFENesin (MUCINEX) 600 MG extended release tablet Take 1 tablet by mouth 2 times daily 20 tablet 1    omeprazole (PRILOSEC) 40 MG delayed release capsule Take 1 capsule by mouth 2 times daily 180 capsule 3    fluticasone-vilanterol (BREO ELLIPTA) 100-25 MCG/INH AEPB inhaler Inhale 1 puff into the lungs daily 30 each 2    beclomethasone (QNASL) 80 MCG/ACT AERS nasal spray 1 spray by Each Nare route daily 1 Inhaler 2    Hypodermic Needles-Disposable (SAFETY-AB NEEDLE 18G) MISC Use once a month to administer testosterone 100 each 0    Syringe/Needle, Disp, (SYRINGE 3CC/22GX1\") 22G X 1\" 3 ML MISC Use once a month to administer testosterone 100 each 0    docusate sodium (COLACE) 100 MG capsule Take 100 mg by mouth 2 times daily      aspirin EC 81 MG EC tablet Take 1 tablet by mouth daily with food.  Multiple Vitamin (MULTIVITAMIN) capsule Take 1 capsule by mouth daily      cycloSPORINE (RESTASIS) 0.05 % ophthalmic emulsion Place 1 drop into both eyes 2 times daily. Physical Examination       Well appearing. 1.  Back clear. 2.  Back with scattered stuck on appearing verrucous tan and brown papules. 3.  Left shin with several faint pink macules and small patches. Assessment and Plan     1. Pruritus of the back - ? Due to SKs   2. SK (seborrheic keratosis)     Reassurance. Cryotherapy was discussed and patient agreed to proceed. Consent was obtained. ~ 10 lesions on the back were treated cryotherapy.  2 cycles of liquid nitrogen applied to each lesion for 5 seconds using a Cry-Ac cryo spray gun. Patient was educated regarding the potential risks of blister formation and discomfort. Wound care was discussed. The patient tolerated the procedure well and there were no immediate complications. No charge. 3. Psoriasis of the left leg - much improved    Dovonex cream twice daily. Resume clobetasol cream twice daily as needed for flares.           --Gustavo Harrell MD

## 2022-07-12 NOTE — PROGRESS NOTES
LENTIGO MALIGNA STAGED SERIAL EXCISION NOTE     SURGEON: Juan Gaona. Liss Dorsey MD    ASSISTANT:  Edward Norton RN    REFERRING PHYSICIAN:   Artemio Beasley MD    PREOPERATIVE DIAGNOSIS: Lentigo Maligna     POSTOPERATIVE DIAGNOSIS: SAME. OPERATIVE PROCEDURE: STAGED SERIAL EXCISION (SLOW MOHS)     RECONSTRUCTION OF DEFECT: DEFERRED UNTIL CONFIRMATION OF CLEARANCE OF TUMOR     LOCATION: Left Upper Back     SIZE OF LESION: 21 x 13 MM     SIZE OF LESION PLUS CIRCUMFERENTIAL MARGIN OF STAGE I:  35 X 26 MM     ANESTHESIA:15 CC XYLOCAINE 1% WITH EPINEPHRINE 1:100,000, BUFFERED. DURATION OF PROCEDURE: 10 MINUTES     POSTOPERATIVE OBSERVATION: 30 MINUTES     EBL: MINIMAL. SPECIMENS: 5    COMPLICATIONS: NONE     DESCRIPTION OF PROCEDURE: The patient was given a mirror and the biopsy site was identified, marked with surgical marking pen, and verified with the patient. Written consent was obtained. There was a time out for person and procedure verification. The operative site was cleansed with Hibiclens, then cleaned off, dried, and draped sterilely. The lesion was excised in a disc design down to subcutaneous fat with 1 mm margins, placed in formalin and labelled \"A\" and sent to pathology for examination of tumor depth/clearance. Hemostasis was achieved with electrosurgery. Another 5-7 mm rim of tissue was taken circumferentially at the peripheral margin of the clinically apparent pigmented lesion. This was divided into 4 sections, labelled B- corresponding to section 12 o'clock to 3 o'clock, labelled C corresponding to section 3 o'clock to 6 o'clock, labelled D corresponding to 6 o'clock to 9 o'clock and E corresponding to 9 o'clock to 12 o'clock. The inner margins were inked green and sutures were placed at 12, 3, 6 and 9 o'clock for orientation. These specimens were individually placed in formalin specimen jars.      All specimens were delivered to the pathology lab ASAP following removal.     A representative map was drawn and labelled and placed in the patient's chart as well as sent with the pathologic specimens. A pressure dressing was applied to the wound. The patient was given detailed oral and written instructions on home care and all questions were answered. The patient tolerated the procedure well without any complications. The patient left the office in stable condition. The patient is scheduled to return on THURSDAY for Stage II or repair depending on pathology results.

## 2022-07-13 ENCOUNTER — TELEPHONE (OUTPATIENT)
Dept: SURGERY | Age: 72
End: 2022-07-13

## 2022-07-13 NOTE — TELEPHONE ENCOUNTER
Hi Team, the  has picked up the STAT case and is delivering it to [facility] to be processed today. I spoke with Marina Collazo and she informed me that the specimen would have needed to sit in formalin for 24 hours before processing the tissue and she had called a  to .

## 2022-07-14 ENCOUNTER — PROCEDURE VISIT (OUTPATIENT)
Dept: SURGERY | Age: 72
End: 2022-07-14
Payer: MEDICARE

## 2022-07-14 VITALS — DIASTOLIC BLOOD PRESSURE: 69 MMHG | SYSTOLIC BLOOD PRESSURE: 125 MMHG

## 2022-07-14 DIAGNOSIS — D03.59 MELANOMA IN SITU OF BACK (HCC): Primary | ICD-10-CM

## 2022-07-14 LAB — DERMATOLOGY PATHOLOGY REPORT: ABNORMAL

## 2022-07-14 PROCEDURE — 13102 CMPLX RPR TRUNK ADDL 5CM/<: CPT | Performed by: DERMATOLOGY

## 2022-07-14 PROCEDURE — 13101 CMPLX RPR TRUNK 2.6-7.5 CM: CPT | Performed by: DERMATOLOGY

## 2022-07-14 NOTE — PROGRESS NOTES
DELAYED REPAIR INTRAOPERATIVE PROCEDURE NOTE    SURGEON:  Johnson Prader. Cailin Morales MD    ASSISTANT: Sebastien Robison RN    REFERRING PHYSICIAN:   Chante Hidalgo MD    PRE-OPERATIVE DIAGNOSIS:  Defect resulting from excision of a lentigo maligna    POST-OPERATIVE DIAGNOSIS: SAME. OPERATIVE PROCEDURE:  DELAYED REPAIR    RECONSTRUCTION OF DEFECT: Complex layered closure    LOCATION: Left upper back    SIZE OF DEFECT:35x28 MM    FINAL WOUND LENGTH:  79 MM    ANESTHESIA:  24 cc1% lidocaine with epinephrine 1:100,000 buffered      DURATION OF PROCEDURE: 30 MINUTES    POSTOPERATIVE OBSERVATION: 30 MINUTES    EBL: MINIMAL. SPECIMENS:  0    COMPLICATIONS: None    DESCRIPTION OF PROCEDURE:   REPAIR:  Various closure modalities were discussed with the patient, and it was decided that a complex repair would best preserve normal anatomic and functional relationships. Additional risk of wound dehiscence was discussed. This is a complex closure based on: Undermining    Extensive undermining was performed: the distance of undermining was 30mm, which is equal to or greater than the maximum width of the defect, measured perpendicular to the closure line along at least one entire edge of the defect. The patient was placed on the procedure room table. The area was anesthetized with 1% lidocaine with epinephrine 1:100,000 buffered, was given a sterile prep using Chlorhexidine gluconate 4% solution, and draped in the usual sterile fashion. Recreation and enlargement of the wound was performed by excising cones of tissue via the triangulation technique. The final incision lines were placed with respect for the patient's natural skin tension lines in a linear configuration to avoid functional and aesthetic distortion of adjacent free margins. Following undermining, meticulous hemostasis was obtained with spot monopolar electrocoagulation.   Subcutaneous dead space and dermis were closed using 3-0 Vicryl buried subcutaneous interrupted suture and the epidermis was approximated with 4-0 Ethilon using interrupted epidermal sutures. The area was cleansed with sterile saline. Petrolatum ointment was applied to the wound and a pressure dressing was applied. Detailed post-care instructions were verbally reviewed with the patient and a handout given. The patient tolerated the procedure well without any complications. The patient left the office in good condition. The patient will return for suture removal/wound check in 14 days.

## 2022-07-14 NOTE — PATIENT INSTRUCTIONS
[facility] Office Hours:    Monday-Thursday  7:30 AM-4:30 PM    Friday  9:00 AM-1:00 PM      POST-OPERATIVE CARE FOR STICHES  Bandage change after 48 hours    CARING FOR YOUR SURGICAL SITE  The bandage should remain on and completely dry for 48 hours. Do NOT get the bandage wet. 1. After the first 48 hours, gently remove the remaining part of the bandage. It can be helpful to moisten the bandage edges in the shower. Steri strips may still be on the wound. It is ok, they will fall off slowly with the daily bandage changes. 2. Gently clean the wound daily with mild soap and water. Try to clean off crust and debris. 3. Dry (pat) the area with a clean Q-tip or gauze. 4. Apply a layer of Vaseline/ Aquaphor (or Bacitracin if your doctor recommends) to the wound area only. 5. Cut a piece of Telfa (or any non-stick dressing) to fit just over the wound and secure it with paper tape. If the wound is small you may use a Band- Aid. Keep area covered for a total of 2 week(s). If the dressing comes off or if you have questions, or concerns about the dressing, please call the office for instructions! POST OPERATIVE INSTRUCTIONS    1. Activity: Do not lift anything heavier than a gallon of milk for 1 week. Also, avoid strenuous activity such as running, power walking or contact sports. 2. Eating and drinking: Do not drink alcohol for 48 hours after your procedure. Alcohol increases the chances of bleeding. 3. Medicines   -If you have discomfort, take Acetaminophen (Tylenol or Extra Strength Tylenol). Follow the instructions and warning on the bottle. -If your doctor has prescribed you an Aspirin daily, please keep taking it. Do not take extra Aspirin or medicines containing Aspirin (such as Kristina-[place] and Excedrin) for 48 hours after your procedure. Bleeding: If bleeding occurs, DO NOT remove the bandage. Put firm pressure on the area with gauze for 20 minutes without peeking.  If the bleeding continues, apply pressure for another 20 minutes. If the bleeding does not stop after you apply pressure, call us right away. If you can not call, go to the nearest emergency room or urgent care facility. What to expect:  You may have these symptoms. They are normal and should get better with time:  1. Swelling. Swelling usually increases for the first 48 hours after your procedure and then begins to improve. Some soreness and redness around your wound. If we worked close to your eyes (forehead, nose, temple, or upper cheeks) your eyes may become swollen and/ or black and blue. 2. Bruising, which could last 1 week or more. 3. Pink and bumpy appearance to the scar. This may happen a few weeks after your procedure. After 4 weeks, you may gently massage the area each day with facial moisturizer or petroleum jelly (Vaseline or Aquaphor). This will help to smooth the skin and improve the appearance of the scar. The color of your scar will fade over time, but it may be pink for several months after the procedure. The scar may take 6 months to 1 year to reach its final color and appearance. 4. \"Spitting\" suture. Occasionally, an inside suture (stitch) does not completely dissolve. When this happens, (generally 4-8 weeks after surgery), it causes a bump or \"pimple\" to form on the scar. This is easily removed and is not at all serious. It does not mean the skin cancer has returned. Contact us if it happens, but do not be alarmed. Vitamin E oil is NOT necessary. A good moisturizer is just as effective. Sunscreen IS necessary. Use at least and SPF 30 sunscreen daily- even in winter    Call us at [phone] right away if you have any of the following symptoms:  -Bleeding that you can not stop (see highlighted area above). -Pain that lasts longer than 48 hours.  -Your wound becomes more painful, red or hot.  -Swelling that does not begin to improve within the 48 hours or gets worse suddenly.

## 2022-07-15 ENCOUNTER — TELEPHONE (OUTPATIENT)
Dept: SURGERY | Age: 72
End: 2022-07-15

## 2022-07-15 NOTE — TELEPHONE ENCOUNTER
The patient was in the office on 7/14/22 for delayed repair after excision for lentigo maligna located on the left upper back with CLC repair. The patient tolerated the procedure well and left the office in good condition. Pain level on post-operative day 1:  level of pain (1-10, 10 severe), yesterday was level 8 and not reduced after Tylenol administration. Patient stated he had \"been bad\" having taken Percocet leftover from another procedure. Patient stated he slept well after taking the Percocet, but felt he would be able to control the pain today with Tylenol. Any bleeding episode that required pressure to be held, bandage change or a call to the office or MD?  no     Any other issues?:  no    A post-operative telephone call was placed at 7/15/22 at 11:55 a.m in order to check on the patient's recovery process. The patient reported doing well and had no complaints other than those listed above, if any. All of the patient's questions were answered. Reminded patient of f/u apt.  on 7/28/22 at 2:30 p.m. for suture removal.

## 2022-07-28 ENCOUNTER — NURSE ONLY (OUTPATIENT)
Dept: SURGERY | Age: 72
End: 2022-07-28

## 2022-07-28 DIAGNOSIS — Z48.02 VISIT FOR SUTURE REMOVAL: Primary | ICD-10-CM

## 2022-07-28 NOTE — PATIENT INSTRUCTIONS
[facility] Office Hours:    Monday-Thursday  7:30 AM-4:30 PM    Friday  9:00 AM-1:00 PM     Patient is here for suture removal s/p excision of a LT upper back. The site appears well-healed without signs of infection pain or discharge, slight redness from the bandage adhesive on the outer areas from the incision. Alcohol pad was used to clean the are & the sutures were removed. Steri-strips were not applied. Wound care and activity instructions given. Vaseline and a bandage were applied     Further treatment is not indicated given the area was well healed. F/u PRN.

## 2022-08-05 ENCOUNTER — TELEPHONE (OUTPATIENT)
Dept: DERMATOLOGY | Age: 72
End: 2022-08-05

## 2022-08-05 NOTE — TELEPHONE ENCOUNTER
Pt calling states his left leg where  took something off states it is still sore and he has a personal question for him as well would like for him to give him a call when he gets a chance back @ [phone] to discuss

## 2022-08-08 NOTE — TELEPHONE ENCOUNTER
Informed patient of Dr Farrukh Ivy response. Patient verbalized understanding.      Patient asked if you could send the script to [facility] due to cheaper cost.

## 2022-08-08 NOTE — TELEPHONE ENCOUNTER
Tried calling patient 3 times but every time the call was picked up, no one responded and the call was then disconnected.

## 2022-08-08 NOTE — TELEPHONE ENCOUNTER
Called and spoke to patient and he said the biopsy site on his L shin from 5/11/22 is tender, red around, and looks green in the center. He is concerned that it could be infected. Patient will be sending a photo of the site for Dr Taz Lockhart to review.

## 2022-08-16 RX ORDER — CLOBETASOL PROPIONATE 0.5 MG/G
CREAM TOPICAL
Qty: 60 G | Refills: 3 | Status: SHIPPED | OUTPATIENT
Start: 2022-08-16

## 2022-08-18 ENCOUNTER — TELEPHONE (OUTPATIENT)
Dept: SURGERY | Age: 72
End: 2022-08-18

## 2022-08-18 NOTE — TELEPHONE ENCOUNTER
Pt states that at the wound site, the site itches and it feels there's a needle in his back. He had left a message the other day but did not hear from our office. The area is not red. He could come in tomorrow but the next day would be Tuesday. Call home line please. His wife looked at the area and see's a blue stitch. Please call pt to discuss.

## 2022-08-19 NOTE — TELEPHONE ENCOUNTER
Pt's wife (RN) sees two blue sutures on incision site. She attempted to pull them out, however, she believes there are still knots there and she does not feel comfortable using the scissors they have at home - informed them we would be glad to take care of them. They live 65 miles away and are able to come in on Tuesday to have this looked at. Follow up scheduled next Tuesday at 1:45, pt requesting an early afternoon appointment. Pt's wife has been cleaning the area and applying Vaseline and states she will continue to do so until next appointment. Both pt and wife are agreeable and understanding.  8/19/2022 at 10:18 AM

## 2022-08-23 ENCOUNTER — OFFICE VISIT (OUTPATIENT)
Dept: SURGERY | Age: 72
End: 2022-08-23

## 2022-08-23 VITALS — TEMPERATURE: 97.2 F

## 2022-08-23 DIAGNOSIS — Z48.02 VISIT FOR SUTURE REMOVAL: Primary | ICD-10-CM

## 2022-08-23 PROCEDURE — 99024 POSTOP FOLLOW-UP VISIT: CPT | Performed by: DERMATOLOGY

## 2022-08-23 NOTE — PROGRESS NOTES
S: Patient c/o irritation to L upper back stating his wife thinks there are two blue stitches that need to be removed that were not noticeable until the swelling in the upper back diminished. 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 Today the patient presents with a healed incision with exception of 2 blue colored stitches on his upper back. A/P: Took photo of L upper back and removed 2 sutures. Applied Vaseline and bandaid. Discharged patient with AVS for care after suture removal.    Patient and his wife state understanding instructions and no further action needed at this time.

## 2022-08-23 NOTE — PATIENT INSTRUCTIONS
[facility] Office Hours:    Monday-Thursday  7:30 AM-4:30 PM    Friday  9:00 AM-1:00 PM    WOUND CARE AFTER SUTURE REMOVAL    After your stiches have been removed, your scar is still very fragile. In fact, scars continue to change and evolve, what we call remodel, for about a year after your procedure. Follow the following steps below to ensure that your scar heals well. Instructions    1. If Steri-strips were applied, keep them on until they fall off on their own. 2. Protect your scar from the sun. Use a sunscreen or bandage to cover your scar. Valeria Gallon exposure can cause your scar to become discolored and appear red or brown. 3. To help soften your scar more rapidly, it is helpful, but not necessary, for you to   massage the scar gently each night for twenty minutes. 4. Spitting suture. Occasionally, an inside suture (stitch) does not completely dissolve. When this happens, (generally 4-8 weeks after surgery), it causes a bump or pimple to form on the scar. This is easily removed and is not at all serious. It   does not mean the skin cancer has returned. Contact us if it happens, but do not be alarmed. 5. If you scar becomes tender, itchy or becomes very large, let Dr. Heather Connelly know. There    are treatments that can improve the appearance of your scar or help make it more comfortable.

## 2022-11-03 ENCOUNTER — TELEPHONE (OUTPATIENT)
Dept: DERMATOLOGY | Age: 72
End: 2022-11-03

## 2022-11-03 NOTE — TELEPHONE ENCOUNTER
[phone]. Patient is wondering if there are any available appointments to see  sooner than his appointment on Monday, November 28th. Patient says his biopsy site from a few months ago still has not healed, and there is now a few new, red & raised spots around this biopsy site. He is available. .  November 15th- afternoon November 16th- late morning  November 17th&18th- late morning or early afternoon.     Please advise, thank you!!

## 2022-11-09 ENCOUNTER — OFFICE VISIT (OUTPATIENT)
Dept: DERMATOLOGY | Age: 72
End: 2022-11-09
Payer: MEDICARE

## 2022-11-09 DIAGNOSIS — L81.4 SOLAR LENTIGINOSIS: ICD-10-CM

## 2022-11-09 DIAGNOSIS — L57.0 ACTINIC KERATOSIS: ICD-10-CM

## 2022-11-09 DIAGNOSIS — L82.1 SK (SEBORRHEIC KERATOSIS): ICD-10-CM

## 2022-11-09 DIAGNOSIS — L40.9 PSORIASIS: Primary | ICD-10-CM

## 2022-11-09 PROCEDURE — 99213 OFFICE O/P EST LOW 20 MIN: CPT | Performed by: DERMATOLOGY

## 2022-11-09 PROCEDURE — 17003 DESTRUCT PREMALG LES 2-14: CPT | Performed by: DERMATOLOGY

## 2022-11-09 PROCEDURE — G8427 DOCREV CUR MEDS BY ELIG CLIN: HCPCS | Performed by: DERMATOLOGY

## 2022-11-09 PROCEDURE — G8421 BMI NOT CALCULATED: HCPCS | Performed by: DERMATOLOGY

## 2022-11-09 PROCEDURE — 17000 DESTRUCT PREMALG LESION: CPT | Performed by: DERMATOLOGY

## 2022-11-09 PROCEDURE — 3017F COLORECTAL CA SCREEN DOC REV: CPT | Performed by: DERMATOLOGY

## 2022-11-09 PROCEDURE — 1036F TOBACCO NON-USER: CPT | Performed by: DERMATOLOGY

## 2022-11-09 PROCEDURE — 1123F ACP DISCUSS/DSCN MKR DOCD: CPT | Performed by: DERMATOLOGY

## 2022-11-09 PROCEDURE — G8484 FLU IMMUNIZE NO ADMIN: HCPCS | Performed by: DERMATOLOGY

## 2022-11-09 NOTE — PROGRESS NOTES
[facility]  Suzy Blevins MD  [phone]      Cristiane Morley  1950    67 y.o. male     Date of Visit: 11/9/2022    Chief Complaint: psoriasis, skin lesions    History of Present Illness:    1. Follow up for psoriasis of the legs. Doing well with alternating use of Dovonex cream and clobetasol cream.      2.  He has multiple growths on the scalp, back and lower abdomen. Some are occasionally pruritic. 3.  He has multiple stable pigmented lesions on the trunk and extremities. 4.  He has a couple of persistent scaly lesions on the left upper forehead. 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 Mary Castro on 7/12/2022. He has a history of a superficial BCC on the right mid posterior lateral thigh status post curettage and a half 2018. He has a history of mildly dysplastic nevus on the left upper back status post removal with biopsy on 11/12/2020. Review of Systems:  Gen: Feels well, good sense of health. Past Medical History, Family History, Surgical History, Medications and Allergies reviewed.     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    Dyspnea on exertion 11/8/2010    Erectile dysfunction 11/8/2010    Essential hypertension 11/4/2015    Fatty liver 11/8/2010    Gastroesophageal reflux disease with esophagitis 5/27/2016    Gastroesophageal reflux disease without esophagitis 11/30/2015    GERD (gastroesophageal reflux disease) 11/8/2010    Heme positive stool 11/8/2010    Hyperglycemia 11/8/2010    Hyperlipidemia 11/8/2010    Hypertension 11/8/2010    Left ventricular hypertrophy 11/8/2010    Lumbar disc disease 1/3/2013    Lumbar disc herniation 11/8/2010    Lumbar facet arthropathy 1/3/2013    Lumbar radiculopathy 11/8/2010    Lumbar spinal stenosis 1/3/2013    Obesity 11/8/2010    Perennial allergic rhinitis 10/17/2016    Polycythemia     Retinal detachment 9/30/2013    Right eye - 2013    Testosterone deficiency 3/7/2013     Past Surgical History:   Procedure Laterality Date    BACK SURGERY  07/01/2017    CATARACT REMOVAL WITH IMPLANT Left November 11, 2008    Dr. Dillan Hair    COLONOSCOPY  December 1, 2010    Dr. Shaji Soto  02/13/2017    [facility]      w/graft per PT (upper front area per PT)    EXCISION/BIOPSY Left 07/12/2022    Upper back for Melanoma in [address] [place] [address] Road  August 1, 2013    Dr. Rut Tang - right L3-L4 and L4-L5 - [address] [place]  January 14, 2010    Dr. Sonia Lora Right November 2000    Dr. Alvarez December Left March 2007    Dr. Debbie Dash and Lor Christopher 666 toe nail    UPPER GASTROINTESTINAL ENDOSCOPY  August 3, 2012    Dr. Fabiana Borges  May 2, 2016    Dr. Marie Sldavidson    VITRECTOMY Left April 30, 2008    Dr. Kelly Nash - trans pars plana vitrectomy, fluid-air exchange cryoretinopexy       Allergies   Allergen Reactions    Clindamycin/Lincomycin Nausea Only     Outpatient Medications Marked as Taking for the 11/9/22 encounter (Office Visit) with Wesley Perez MD   Medication Sig Dispense Refill    clobetasol (TEMOVATE) 0.05 % cream APPLY TO THE AFFECTED AREAS ON LEFT SHIN TWICE A DAY FOR UP TO 2 WEEKS OR UNTIL IMPROVED 60 g 3    mupirocin (BACTROBAN) 2 % ointment Apply to the lesion on the left leg 3 times daily for 10 days.  22 g 1    pantoprazole (PROTONIX) 40 MG tablet Take 40 mg by mouth daily      calcipotriene (DOVONEX) 0.005 % cream Apply to the legs twice daily for psoriasis. 60 g 2    triamcinolone (KENALOG) 0.1 % cream APPLY TO ITCHY AREAS ON THE CHEST, ABDOMEN, BACK AND SHOULDERS TWICE DAILY UNTIL IMPROVED 454 g 2    mometasone (ELOCON) 0.1 % ointment Apply to affected to the lesions on the shins twice daily for up 2 weeks or until improved. 45 g 2    carvedilol (COREG) 12.5 MG tablet Take 1 tablet by mouth 2 times daily 180 tablet 3    montelukast (SINGULAIR) 10 MG tablet Take 1 tablet by mouth nightly 90 tablet 3    methocarbamol (ROBAXIN-750) 750 MG tablet Take 1 tablet by mouth 2 times daily 180 tablet 3    guaiFENesin (MUCINEX) 600 MG extended release tablet Take 1 tablet by mouth 2 times daily 20 tablet 1    fluticasone-vilanterol (BREO ELLIPTA) 100-25 MCG/INH AEPB inhaler Inhale 1 puff into the lungs daily 30 each 2    Hypodermic Needles-Disposable (SAFETY-AB NEEDLE 18G) MISC Use once a month to administer testosterone 100 each 0    Syringe/Needle, Disp, (SYRINGE 3CC/22GX1\") 22G X 1\" 3 ML MISC Use once a month to administer testosterone 100 each 0    docusate sodium (COLACE) 100 MG capsule Take 100 mg by mouth 2 times daily      aspirin EC 81 MG EC tablet Take 1 tablet by mouth daily with food. Multiple Vitamin (MULTIVITAMIN) capsule Take 1 capsule by mouth daily      cycloSPORINE (RESTASIS) 0.05 % ophthalmic emulsion Place 1 drop into both eyes 2 times daily. Physical Examination       The following were examined and determined to be normal: Psych/Neuro, Scalp/hair, Conjunctivae/eyelids, Gums/teeth/lips, Neck, Breast/axilla/chest, Abdomen, Back, RUE, LUE, RLE, and Nails/digits. The following were examined and determined to be abnormal: Head/face and LLE. Well appearing. 1.  Left leg with few scaly pink papules and small plaques. 2.  Back, scalp and right lower abdomen: stuck-on appearing tan-brown verrucous papules and plaques.      3.  Trunk and extremities with multiple smooth brown macules and patches. 4.  Left upper forehead - 2 hyperkeratotic pink macules. Assessment and Plan     1. Psoriasis of the legs - improved with topical therapy, mild today, <1% BSA    Continue calcipotriene cream twice daily. Can alternate with use of clobetasol cream every couple of weeks as needed. 2. SK (seborrheic keratosis)     Reassurance. 3. Solar lentiginosis     Monitor for change. Sun protective behaviors encouraged including use of at least SPF 30 or more sunscreen. 4. Actinic keratosis - 2    Cryotherapy was discussed and patient agreed to proceed. Consent was obtained. 2 lesions were treated cryotherapy: left upper forehead. 2 cycles of liquid nitrogen applied to each lesion for 5 seconds using a Cry-Ac cryo spray gun. Patient was educated regarding the potential risks of blister formation and discomfort. Wound care was discussed. The patient tolerated the procedure well and there were no immediate complications. Return in about 6 months (around 5/9/2023).     --Cesar Romero MD

## 2023-01-03 RX ORDER — TRIAMCINOLONE ACETONIDE 1 MG/G
CREAM TOPICAL
Qty: 454 G | Refills: 3 | Status: SHIPPED | OUTPATIENT
Start: 2023-01-03

## 2023-05-10 ENCOUNTER — OFFICE VISIT (OUTPATIENT)
Dept: DERMATOLOGY | Age: 73
End: 2023-05-10
Payer: MEDICARE

## 2023-05-10 DIAGNOSIS — L40.9 PSORIASIS: Primary | ICD-10-CM

## 2023-05-10 DIAGNOSIS — L57.0 ACTINIC KERATOSIS: ICD-10-CM

## 2023-05-10 DIAGNOSIS — L82.1 SK (SEBORRHEIC KERATOSIS): ICD-10-CM

## 2023-05-10 DIAGNOSIS — L81.4 SOLAR LENTIGINOSIS: ICD-10-CM

## 2023-05-10 DIAGNOSIS — A63.0 CONDYLOMA ACUMINATA: ICD-10-CM

## 2023-05-10 PROCEDURE — 17000 DESTRUCT PREMALG LESION: CPT | Performed by: DERMATOLOGY

## 2023-05-10 PROCEDURE — G8427 DOCREV CUR MEDS BY ELIG CLIN: HCPCS | Performed by: DERMATOLOGY

## 2023-05-10 PROCEDURE — 17111 DESTRUCTION B9 LESIONS 15/>: CPT | Performed by: DERMATOLOGY

## 2023-05-10 PROCEDURE — 99213 OFFICE O/P EST LOW 20 MIN: CPT | Performed by: DERMATOLOGY

## 2023-05-10 PROCEDURE — G8421 BMI NOT CALCULATED: HCPCS | Performed by: DERMATOLOGY

## 2023-05-10 PROCEDURE — 1036F TOBACCO NON-USER: CPT | Performed by: DERMATOLOGY

## 2023-05-10 PROCEDURE — 1123F ACP DISCUSS/DSCN MKR DOCD: CPT | Performed by: DERMATOLOGY

## 2023-05-10 PROCEDURE — 3017F COLORECTAL CA SCREEN DOC REV: CPT | Performed by: DERMATOLOGY

## 2023-05-10 NOTE — PROGRESS NOTES
care was discussed. The patient tolerated the procedure well and there were no immediate complications. Return in about 6 months (around 11/10/2023).     --Selena Marquez MD

## 2023-05-26 ENCOUNTER — TELEPHONE (OUTPATIENT)
Dept: DERMATOLOGY | Age: 73
End: 2023-05-26

## 2023-05-30 ENCOUNTER — TELEPHONE (OUTPATIENT)
Dept: DERMATOLOGY | Age: 73
End: 2023-05-30

## 2023-06-01 ENCOUNTER — OFFICE VISIT (OUTPATIENT)
Dept: DERMATOLOGY | Age: 73
End: 2023-06-01
Payer: MEDICARE

## 2023-06-01 DIAGNOSIS — A63.0 CONDYLOMA ACUMINATA: Primary | ICD-10-CM

## 2023-06-01 PROCEDURE — 17111 DESTRUCTION B9 LESIONS 15/>: CPT | Performed by: DERMATOLOGY

## 2023-06-01 NOTE — PROGRESS NOTES
CYPIONATE) 200 MG/ML injection Administer 1 ml (200 mg) intramuscularly every two weeks. 6 vial 2    methocarbamol (ROBAXIN-750) 750 MG tablet Take 1 tablet by mouth 2 times daily 180 tablet 3    guaiFENesin (MUCINEX) 600 MG extended release tablet Take 1 tablet by mouth 2 times daily 20 tablet 1    fluticasone-vilanterol (BREO ELLIPTA) 100-25 MCG/INH AEPB inhaler Inhale 1 puff into the lungs daily 30 each 2    Hypodermic Needles-Disposable (SAFETY-AB NEEDLE 18G) MISC Use once a month to administer testosterone 100 each 0    Syringe/Needle, Disp, (SYRINGE 3CC/22GX1\") 22G X 1\" 3 ML MISC Use once a month to administer testosterone 100 each 0    docusate sodium (COLACE) 100 MG capsule Take 1 capsule by mouth 2 times daily      aspirin EC 81 MG EC tablet Take 1 tablet by mouth daily with food. Multiple Vitamin (MULTIVITAMIN) capsule Take 1 capsule by mouth daily      cycloSPORINE (RESTASIS) 0.05 % ophthalmic emulsion Place 1 drop into both eyes 2 times daily           Physical Examination       Well appearing. 1.  Below the pannus on the right side ~30, below pannus on the left side ~ 10: multiple discrete and coalescing verrucous pink papules and plaques. Assessment and Plan     1. Condyloma acuminata in the skin folds below the abdomen - numerous lesions    Cryotherapy was discussed and patient agreed to proceed. Consent was obtained. 40 lesions were treated cryotherapy: below the pannus on the right side ~30, below pannus on the left side ~ 10. 2 cycles of liquid nitrogen applied to each lesion for 5 seconds using a Cry-Ac cryo spray gun. Patient was educated regarding the potential risks of blister formation and discomfort. Wound care was discussed. The patient tolerated the procedure well and there were no immediate complications. Return in 2 to 3 weeks.     --Beatriz Ocasio MD

## 2023-06-22 ENCOUNTER — OFFICE VISIT (OUTPATIENT)
Dept: DERMATOLOGY | Age: 73
End: 2023-06-22
Payer: MEDICARE

## 2023-06-22 DIAGNOSIS — A63.0 CONDYLOMA ACUMINATA: Primary | ICD-10-CM

## 2023-06-22 PROCEDURE — 17111 DESTRUCTION B9 LESIONS 15/>: CPT | Performed by: DERMATOLOGY

## 2023-06-22 NOTE — PROGRESS NOTES
[facility]  Yi Gay MD  [phone]      Arianne Blevins  1950    67 y.o. male     Date of Visit: 6/22/2023    Chief Complaint: condyloma    History of Present Illness:    He returns today for multiple condyloma acuminata below the abdomen. He reports improvement with cryotherapy. Review of Systems:  Gen: Feels well, good sense of health. Past Medical History, Family History, Surgical History, Medications and Allergies reviewed.     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    Dyspnea on exertion 11/8/2010    Erectile dysfunction 11/8/2010    Essential hypertension 11/4/2015    Fatty liver 11/8/2010    Gastroesophageal reflux disease with esophagitis 5/27/2016    Gastroesophageal reflux disease without esophagitis 11/30/2015    GERD (gastroesophageal reflux disease) 11/8/2010    Heme positive stool 11/8/2010    Hyperglycemia 11/8/2010    Hyperlipidemia 11/8/2010    Hypertension 11/8/2010    Left ventricular hypertrophy 11/8/2010    Lumbar disc disease 1/3/2013    Lumbar disc herniation 11/8/2010    Lumbar facet arthropathy 1/3/2013    Lumbar radiculopathy 11/8/2010    Lumbar spinal stenosis 1/3/2013    Obesity 11/8/2010    Perennial allergic rhinitis 10/17/2016    Polycythemia     Retinal detachment 9/30/2013    Right eye - 2013    Testosterone deficiency 3/7/2013     Past Surgical History:   Procedure Laterality Date    BACK SURGERY  07/01/2017    CATARACT REMOVAL WITH IMPLANT Left November 11, 2008    Dr. Ismael Riley    COLONOSCOPY  December 1, 2010    Dr. Sharri Gonsalves  02/13/2017    [facility]

## 2023-07-10 ENCOUNTER — OFFICE VISIT (OUTPATIENT)
Dept: DERMATOLOGY | Age: 73
End: 2023-07-10
Payer: MEDICARE

## 2023-07-10 DIAGNOSIS — A63.0 CONDYLOMA ACUMINATA: Primary | ICD-10-CM

## 2023-07-10 PROCEDURE — 17111 DESTRUCTION B9 LESIONS 15/>: CPT | Performed by: DERMATOLOGY

## 2023-07-10 RX ORDER — AMOXICILLIN 500 MG/1
500 CAPSULE ORAL 3 TIMES DAILY
COMMUNITY
Start: 2023-07-06

## 2023-07-10 RX ORDER — CLOBETASOL PROPIONATE 0.5 MG/G
CREAM TOPICAL
Qty: 60 G | Refills: 2 | Status: SHIPPED | OUTPATIENT
Start: 2023-07-10

## 2023-07-10 NOTE — PROGRESS NOTES
Take 1 capsule by mouth nightly for 90 days. . 90 capsule 0    montelukast (SINGULAIR) 10 MG tablet Take 1 tablet by mouth nightly 90 tablet 3    methocarbamol (ROBAXIN-750) 750 MG tablet Take 1 tablet by mouth 2 times daily 180 tablet 3    guaiFENesin (MUCINEX) 600 MG extended release tablet Take 1 tablet by mouth 2 times daily 20 tablet 1    fluticasone-vilanterol (BREO ELLIPTA) 100-25 MCG/INH AEPB inhaler Inhale 1 puff into the lungs daily 30 each 2    Hypodermic Needles-Disposable (SAFETY-AB NEEDLE 18G) MISC Use once a month to administer testosterone 100 each 0    Syringe/Needle, Disp, (SYRINGE 3CC/22GX1\") 22G X 1\" 3 ML MISC Use once a month to administer testosterone 100 each 0    docusate sodium (COLACE) 100 MG capsule Take 1 capsule by mouth 2 times daily      aspirin EC 81 MG EC tablet Take 1 tablet by mouth daily with food. Multiple Vitamin (MULTIVITAMIN) capsule Take 1 capsule by mouth daily      cycloSPORINE (RESTASIS) 0.05 % ophthalmic emulsion Place 1 drop into both eyes 2 times daily           Physical Examination       Well appearing. Right lateral abdominal fold - 10, left lateral abdominal fold - 8: several verrucous pink papules and plaques. Assessment and Plan     1. Condyloma acuminata - multiple, improving with cryotherapy    Cryotherapy was discussed and patient agreed to proceed. Consent was obtained. 18 lesions were treated cryotherapy: Right lateral abdominal fold - 10, left lateral abdominal fold - 8. 2 cycles of liquid nitrogen applied to each lesion for 5 seconds using a Cry-Ac cryo spray gun. Patient was educated regarding the potential risks of blister formation and discomfort. Wound care was discussed. The patient tolerated the procedure well and there were no immediate complications.           --Bill Gutierrez MD

## 2023-07-17 ENCOUNTER — PATIENT MESSAGE (OUTPATIENT)
Dept: DERMATOLOGY | Age: 73
End: 2023-07-17

## 2023-08-24 ENCOUNTER — OFFICE VISIT (OUTPATIENT)
Dept: DERMATOLOGY | Age: 73
End: 2023-08-24
Payer: MEDICARE

## 2023-08-24 DIAGNOSIS — A63.0 CONDYLOMA ACUMINATA: Primary | ICD-10-CM

## 2023-08-24 PROCEDURE — 17111 DESTRUCTION B9 LESIONS 15/>: CPT | Performed by: DERMATOLOGY

## 2023-08-24 RX ORDER — METHYLPREDNISOLONE 4 MG/1
TABLET ORAL
COMMUNITY
Start: 2023-08-15

## 2023-08-24 NOTE — PROGRESS NOTES
[facility]  Parker Mccarthy MD  [phone]      Rich Barajas  1950    67 y.o. male     Date of Visit: 8/24/2023    Chief Complaint: condyloma     History of Present Illness:    He returns today for condyloma acuminata of the lower abdominal crease. He's had a recurrence on the right side with increasing numbers of lesions. He had been doing well with cryotherapy. Review of Systems:  Gen: Feels well, good sense of health. Past Medical History, Family History, Surgical History, Medications and Allergies reviewed.     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    Dyspnea on exertion 11/8/2010    Erectile dysfunction 11/8/2010    Essential hypertension 11/4/2015    Fatty liver 11/8/2010    Gastroesophageal reflux disease with esophagitis 5/27/2016    Gastroesophageal reflux disease without esophagitis 11/30/2015    GERD (gastroesophageal reflux disease) 11/8/2010    Heme positive stool 11/8/2010    Hyperglycemia 11/8/2010    Hyperlipidemia 11/8/2010    Hypertension 11/8/2010    Left ventricular hypertrophy 11/8/2010    Lumbar disc disease 1/3/2013    Lumbar disc herniation 11/8/2010    Lumbar facet arthropathy 1/3/2013    Lumbar radiculopathy 11/8/2010    Lumbar spinal stenosis 1/3/2013    Obesity 11/8/2010    Perennial allergic rhinitis 10/17/2016    Polycythemia     Retinal detachment 9/30/2013    Right eye - 2013    Testosterone deficiency 3/7/2013     Past Surgical History:   Procedure Laterality Date    BACK SURGERY  07/01/2017    CATARACT REMOVAL WITH IMPLANT Left November 11, 2008    Dr. Cherise Quintanilla  December 1, 2010    Dr. Gila Zurita

## 2023-08-25 ENCOUNTER — HOSPITAL ENCOUNTER (OUTPATIENT)
Dept: MRI IMAGING | Age: 73
Discharge: HOME | End: 2023-08-25
Attending: NEUROLOGICAL SURGERY
Payer: MEDICARE

## 2023-08-25 DIAGNOSIS — M51.36 DEGENERATIVE LUMBAR DISC: ICD-10-CM

## 2023-08-25 PROCEDURE — 72148 MRI LUMBAR SPINE W/O DYE: CPT

## 2023-09-27 ENCOUNTER — OFFICE VISIT (OUTPATIENT)
Dept: DERMATOLOGY | Age: 73
End: 2023-09-27

## 2023-09-27 DIAGNOSIS — L82.0 INFLAMED SEBORRHEIC KERATOSIS: ICD-10-CM

## 2023-09-27 DIAGNOSIS — D22.5 IRRITATED NEVUS OF ABDOMINAL WALL: ICD-10-CM

## 2023-09-27 DIAGNOSIS — A63.0 CONDYLOMA ACUMINATA: Primary | ICD-10-CM

## 2023-09-27 NOTE — PATIENT INSTRUCTIONS
Biopsy Wound Care Instructions    Keep the bandage in place for 24 hours. Cleanse the wound with mild soapy water daily  Gently dry the area. Apply Vaseline or petroleum jelly to the wound using a cotton tipped applicator. Cover with a clean bandage. Repeat this process until the biopsy site is healed. If you had stitches placed, continue treating the site until the stitches are removed. Remember to make an appointment to return to have your stitches removed by our staff. You may shower and bathe as usual.       ** Biopsy results generally take around 7 business days to come back. If you have not heard from us by then, please call the office at [phone]. *Please note that biopsy results are released to both the patient and physician at the same time in [facility]. Please allow time for your physician to review the results. One of our staff members will reach out to you with the results and plan.

## 2023-09-27 NOTE — PROGRESS NOTES
acuminata -multiple lesions within the lower abdominal crease, improved but recurrent after treatment with cryotherapy. Tender lesions on the penis. Cryotherapy was discussed and patient agreed to proceed. Consent was obtained. 2 lesions were treated cryotherapy: proximal penile shaft. 2 cycles of liquid nitrogen applied to each lesion for 5 seconds using a Cry-Ac cryo spray gun. Patient was educated regarding the potential risks of blister formation and discomfort. Wound care was discussed. The patient tolerated the procedure well and there were no immediate complications. Start imiquimod 3.75% cream 3 nights per week. Given his sensitivity from his initial use, start with application of just 4 hours followed by washing it off. Discussed using a small amount. We discussed risk of application site reaction/irritation. 2. Irritated nevus of right lower abdominal wall/suprapubic region - 1.2 cm    The patient requested that the lesion be removed. The procedure was discussed in detail. We also reviewed the risks of bleeding, scar, and infection. A consent form was signed by the patient. The lesion to be removed was marked with a surgical pen. Alcohol was used to cleanse the site. Local anesthesia was acheived with 1% lidocaine with epinephrine. Shave removal of the lesion was performed down to mid dermis using a razor blade. Hemostasis was achieved with aluminum chloride. The wound was dressed with petrolatum and covered with a bandage. Wound care instructions were reviewed. 1 Specimen (s) sent to pathology. The specimen bottle(s) were appropriately labeled. The patient tolerated the procedure well and there were no immediate complications. 3. Inflamed seborrheic keratosis - 3    Cryotherapy was discussed and patient agreed to proceed. Consent was obtained.   3 lesions were treated cryotherapy: Right crown of the scalp - 1, left lateral back - 2. 2 cycles of liquid nitrogen

## 2023-09-28 ENCOUNTER — TELEPHONE (OUTPATIENT)
Dept: DERMATOLOGY | Age: 73
End: 2023-09-28

## 2023-09-28 NOTE — TELEPHONE ENCOUNTER
Called patient back and he stated that he has the imiquimod 3.75% cream at home and will start on it in soon.

## 2023-10-03 LAB — DERMATOLOGY PATHOLOGY REPORT: NORMAL

## 2023-10-20 ENCOUNTER — TELEPHONE (OUTPATIENT)
Dept: DERMATOLOGY | Age: 73
End: 2023-10-20

## 2023-10-20 RX ORDER — IMIQUIMOD 12.5 MG/.25G
CREAM TOPICAL
Qty: 50 EACH | Refills: 0 | Status: SHIPPED | OUTPATIENT
Start: 2023-10-20 | End: 2023-10-27

## 2023-10-20 NOTE — TELEPHONE ENCOUNTER
Imiquimod 3.75 cream. Pt is asking for a refill. Pt says Dr. Delano Johnson said that he can refill this medication. He says originally this was prescribed by Dr. Luz Hernández, but Dr. Delano Johnson told him he would refill it for him. He says that the pump is too high. He would need the packets of 50 because they only cost $29.00.      Please send to [address] [place] [address]  Phone [phone]  Fax [phone]    . [phone]

## 2023-11-21 ENCOUNTER — OFFICE VISIT (OUTPATIENT)
Dept: DERMATOLOGY | Age: 73
End: 2023-11-21
Payer: MEDICARE

## 2023-11-21 DIAGNOSIS — D48.5 NEOPLASM OF UNCERTAIN BEHAVIOR OF SKIN: ICD-10-CM

## 2023-11-21 DIAGNOSIS — A63.0 CONDYLOMA ACUMINATA: Primary | ICD-10-CM

## 2023-11-21 DIAGNOSIS — L40.9 PSORIASIS: ICD-10-CM

## 2023-11-21 PROCEDURE — G8421 BMI NOT CALCULATED: HCPCS | Performed by: DERMATOLOGY

## 2023-11-21 PROCEDURE — 99214 OFFICE O/P EST MOD 30 MIN: CPT | Performed by: DERMATOLOGY

## 2023-11-21 PROCEDURE — 1036F TOBACCO NON-USER: CPT | Performed by: DERMATOLOGY

## 2023-11-21 PROCEDURE — G8484 FLU IMMUNIZE NO ADMIN: HCPCS | Performed by: DERMATOLOGY

## 2023-11-21 PROCEDURE — 1123F ACP DISCUSS/DSCN MKR DOCD: CPT | Performed by: DERMATOLOGY

## 2023-11-21 PROCEDURE — 3017F COLORECTAL CA SCREEN DOC REV: CPT | Performed by: DERMATOLOGY

## 2023-11-21 PROCEDURE — 11102 TANGNTL BX SKIN SINGLE LES: CPT | Performed by: DERMATOLOGY

## 2023-11-21 PROCEDURE — G8427 DOCREV CUR MEDS BY ELIG CLIN: HCPCS | Performed by: DERMATOLOGY

## 2023-11-21 NOTE — PROGRESS NOTES
Count includes the [facility]  Ella Pickens MD  [phone]      Venus Cavazos  1950    68 y.o. male     Date of Visit: 11/21/2023    Chief Complaint: skin lesions    History of Present Illness:    1. He returns today to follow-up for extensive condyloma acuminata affecting the crease below the abdomen extending into the inguinal folds. He has had improvement with application of imiquimod 3.75% cream 2-3 times per week for 4 to 5 hours at a time. He is experiencing some irritation but it is tolerable. 2.  He also has a history of psoriasis of the legs which responds well to use of either clobetasol cream or triamcinolone 0.1% cream.  He complains of some new erythematous areas on the trunk and upper extremities. 3.  Unknown duration of an atypical appearing pigmented lesion on the left upper back. 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 Yady Escamilla on 7/12/2022. He has a history of a superficial BCC on the right mid posterior lateral thigh status post curettage and a half 2018. He has a history of mildly dysplastic nevus on the left upper back status post removal with biopsy on 11/12/2020. Review of Systems:  Gen: Feels well, good sense of health. Past Medical History, Family History, Surgical History, Medications and Allergies reviewed.     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

## 2023-11-21 NOTE — PATIENT INSTRUCTIONS
Biopsy Wound Care Instructions    Keep the bandage in place for 24 hours. Cleanse the wound with mild soapy water daily  Gently dry the area. Apply Vaseline or petroleum jelly to the wound using a cotton tipped applicator. Cover with a clean bandage. Repeat this process until the biopsy site is healed. If you had stitches placed, continue treating the site until the stitches are removed. Remember to make an appointment to return to have your stitches removed by our staff. You may shower and bathe as usual.       ** Biopsy results generally take around 7 business days to come back. If you have not heard from us by then, please call the office at [phone]. *Please note that biopsy results are released to both the patient and physician at the same time in [address] [place] [address]. Please allow time for your physician to review the results. One of our staff members will reach out to you with the results and plan.

## 2023-11-27 ENCOUNTER — TELEPHONE (OUTPATIENT)
Dept: DERMATOLOGY | Age: 73
End: 2023-11-27

## 2023-11-27 RX ORDER — CLOBETASOL PROPIONATE 0.5 MG/G
CREAM TOPICAL
Qty: 60 G | Refills: 2 | Status: SHIPPED | OUTPATIENT
Start: 2023-11-27 | End: 2023-11-28 | Stop reason: SDUPTHER

## 2023-11-27 RX ORDER — TRIAMCINOLONE ACETONIDE 1 MG/G
CREAM TOPICAL
Qty: 454 G | Refills: 3 | Status: SHIPPED | OUTPATIENT
Start: 2023-11-27

## 2023-11-27 NOTE — TELEPHONE ENCOUNTER
Submitted PA for Clobetasol Cream  Via CMM Key: [id number]   STATUS: Drug is covered by current benefit plan.  No further PA activity needed

## 2023-11-28 ENCOUNTER — TELEPHONE (OUTPATIENT)
Dept: DERMATOLOGY | Age: 73
End: 2023-11-28

## 2023-11-28 RX ORDER — CLOBETASOL PROPIONATE 0.5 MG/G
CREAM TOPICAL
Qty: 120 G | Refills: 1 | Status: SHIPPED | OUTPATIENT
Start: 2023-11-28 | End: 2023-11-29 | Stop reason: SDUPTHER

## 2023-11-28 NOTE — TELEPHONE ENCOUNTER
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 He already cancelled the request for the 120g.

## 2023-11-28 NOTE — TELEPHONE ENCOUNTER
Patient called and said that the pharmacy only had a prescription for one tube and he needs 2. Please call [phone] before 11 o'clock and [phone] after 11 o'clock.

## 2023-11-28 NOTE — TELEPHONE ENCOUNTER
Pt left a voicemail today at 10:11 am. He says the medication that was called in is wrong. He wants a call back at [phone].

## 2023-11-28 NOTE — TELEPHONE ENCOUNTER
Submitted PA for Clobetasol cream  Via CMM Key: [id number]   STATUS: Drug is covered by current benefit plan. No further PA activity needed. I resubmitted PA for 120g per 30 days and its still stating No PA is required.

## 2023-11-29 LAB — DERMATOLOGY PATHOLOGY REPORT: NORMAL

## 2023-11-29 RX ORDER — CLOBETASOL PROPIONATE 0.5 MG/G
CREAM TOPICAL
Qty: 240 G | Refills: 1 | Status: SHIPPED | OUTPATIENT
Start: 2023-11-29

## 2024-01-30 ENCOUNTER — OFFICE VISIT (OUTPATIENT)
Dept: DERMATOLOGY | Age: 74
End: 2024-01-30
Payer: MEDICARE

## 2024-01-30 DIAGNOSIS — A63.0 CONDYLOMA ACUMINATA: Primary | ICD-10-CM

## 2024-01-30 DIAGNOSIS — L82.0 INFLAMED SEBORRHEIC KERATOSIS: ICD-10-CM

## 2024-01-30 DIAGNOSIS — L57.0 ACTINIC KERATOSIS: ICD-10-CM

## 2024-01-30 DIAGNOSIS — L40.9 PSORIASIS: ICD-10-CM

## 2024-01-30 PROCEDURE — 99213 OFFICE O/P EST LOW 20 MIN: CPT | Performed by: DERMATOLOGY

## 2024-01-30 PROCEDURE — 17111 DESTRUCTION B9 LESIONS 15/>: CPT | Performed by: DERMATOLOGY

## 2024-01-30 PROCEDURE — G8484 FLU IMMUNIZE NO ADMIN: HCPCS | Performed by: DERMATOLOGY

## 2024-01-30 PROCEDURE — 17000 DESTRUCT PREMALG LESION: CPT | Performed by: DERMATOLOGY

## 2024-01-30 PROCEDURE — G8421 BMI NOT CALCULATED: HCPCS | Performed by: DERMATOLOGY

## 2024-01-30 PROCEDURE — G8427 DOCREV CUR MEDS BY ELIG CLIN: HCPCS | Performed by: DERMATOLOGY

## 2024-01-30 PROCEDURE — 1036F TOBACCO NON-USER: CPT | Performed by: DERMATOLOGY

## 2024-01-30 PROCEDURE — 3017F COLORECTAL CA SCREEN DOC REV: CPT | Performed by: DERMATOLOGY

## 2024-01-30 PROCEDURE — 1123F ACP DISCUSS/DSCN MKR DOCD: CPT | Performed by: DERMATOLOGY

## 2024-01-30 RX ORDER — TRIAMCINOLONE ACETONIDE 1 MG/G
CREAM TOPICAL
Qty: 454 G | Refills: 3 | Status: CANCELLED | OUTPATIENT
Start: 2024-01-30

## 2024-01-30 NOTE — PROGRESS NOTES
ventricular hypertrophy 11/8/2010    Lumbar disc disease 1/3/2013    Lumbar disc herniation 11/8/2010    Lumbar facet arthropathy 1/3/2013    Lumbar radiculopathy 11/8/2010    Lumbar spinal stenosis 1/3/2013    Obesity 11/8/2010    Perennial allergic rhinitis 10/17/2016    Polycythemia     Retinal detachment 9/30/2013    Right eye - 2013    Testosterone deficiency 3/7/2013     Past Surgical History:   Procedure Laterality Date    BACK SURGERY  07/01/2017    CATARACT REMOVAL WITH IMPLANT Left November 11, 2008    Dr. Tyrone Santana    COLONOSCOPY  December 1, 2010    Dr. Jose Daniel Britton    [facility]    DENTAL SURGERY      w/graft per PT (upper front area per PT)    EXCISION/BIOPSY Left 07/12/2022    Upper back for Melanoma in situ    HEMORRHOID SURGERY  1982    LUMBAR LAMINECTOMY  August 1, 2013    Dr. Kb Bailey - right L3-L4 and L4-L5 - [facility]    LUMBAR SPINE SURGERY  January 14, 2010    Dr. Kb Bailey - [facility]    REFRACTIVE SURGERY Right November 2000    Dr. Dumont - [place]    REFRACTIVE SURGERY Left March 2007    Dr. Jean Bean - Lasik    TOE SURGERY  1970 and 1980    in-grown toe nail    UPPER GASTROINTESTINAL ENDOSCOPY  August 3, 2012    Dr. Wilson Carpenter    UPPER GASTROINTESTINAL ENDOSCOPY  May 2, 2016    Dr. Wilson Carpenter    VITRECTOMY Left April 30, 2008    Dr. Miguel Staton - trans pars plana vitrectomy, fluid-air exchange cryoretinopexy       Allergies   Allergen Reactions    Clindamycin/Lincomycin Nausea Only     Outpatient Medications Marked as Taking for the 1/30/24 encounter (Office Visit) with Skyler Gutierrez MD   Medication Sig Dispense Refill    clobetasol (TEMOVATE) 0.05 % cream Apply topically 2 times daily for 2 weeks or until improved. 240 g 1    triamcinolone (KENALOG) 0.1 % cream APPLY TO ITCHY AREAS ON CHEST, ABDOMEN, BACK, AND SHOULDERS TWICE A DAY UNTIL IMPROVED 454 g 3    mupirocin (BACTROBAN) 2 %

## 2024-02-15 ENCOUNTER — TELEPHONE (OUTPATIENT)
Dept: DERMATOLOGY | Age: 74
End: 2024-02-15

## 2024-02-15 NOTE — TELEPHONE ENCOUNTER
Patient is having a flare up he stopped using the sterid cream and started using the cereve his back is itching and has spots.  Pt thinks he may be having a reaction.  Please call him at [phone].

## 2024-02-15 NOTE — TELEPHONE ENCOUNTER
Called and spoke to patient and he stated that he is having a flare up of  psoriasis on his arms and back.   He has been using Cerave cream and benadryl cream but it is still bothersome and pruritic.   He said he is hesitant to start using the steroid cream due to the purpura and thinning of skin he experienced before.   I spoke to Dr Gutierrez and he said patient can resume triamcinolone cream until flare resolves.   Patient verbalized understanding and said he will try using the triamcinolone cream once a day until the flare resolves then go back to using the Cerave cream.   He would like to see Dr Gutierrez on Monday 2/19/24 after 3pm if we get any cancellations.   Advised him we'll call him with any cancellations.

## 2024-02-19 ENCOUNTER — OFFICE VISIT (OUTPATIENT)
Dept: DERMATOLOGY | Age: 74
End: 2024-02-19
Payer: MEDICARE

## 2024-02-19 DIAGNOSIS — A63.0 CONDYLOMA ACUMINATA: Primary | ICD-10-CM

## 2024-02-19 DIAGNOSIS — R21 RASH: ICD-10-CM

## 2024-02-19 PROCEDURE — 99213 OFFICE O/P EST LOW 20 MIN: CPT | Performed by: DERMATOLOGY

## 2024-02-19 PROCEDURE — G8484 FLU IMMUNIZE NO ADMIN: HCPCS | Performed by: DERMATOLOGY

## 2024-02-19 PROCEDURE — G8427 DOCREV CUR MEDS BY ELIG CLIN: HCPCS | Performed by: DERMATOLOGY

## 2024-02-19 PROCEDURE — 17110 DESTRUCTION B9 LES UP TO 14: CPT | Performed by: DERMATOLOGY

## 2024-02-19 PROCEDURE — 3017F COLORECTAL CA SCREEN DOC REV: CPT | Performed by: DERMATOLOGY

## 2024-02-19 PROCEDURE — 1123F ACP DISCUSS/DSCN MKR DOCD: CPT | Performed by: DERMATOLOGY

## 2024-02-19 PROCEDURE — G8421 BMI NOT CALCULATED: HCPCS | Performed by: DERMATOLOGY

## 2024-02-19 PROCEDURE — 1036F TOBACCO NON-USER: CPT | Performed by: DERMATOLOGY

## 2024-02-19 NOTE — PROGRESS NOTES
[facility]  Skyler Gutierrez MD  [phone]      Oleg Sosa  1950    73 y.o. male     Date of Visit: 2/19/2024    Chief Complaint: warts and rash    History of Present Illness:    He was last seen nearly 3 weeks ago.    1.  He has a history of extensive condyloma acuminata affecting the skin below the pannus.  He has been using imiquimod 3.75% cream 2 to 3 nights per week.    2.  He has a history of both psoriasis and dermatitis - he recently stopped using triamcinolone 0.1% cream and clobetasol and reports flaring.  He complains of associated pruritus.      Review of Systems:  Gen: Feels well, good sense of health.      Past Medical History, Family History, Surgical History, Medications and Allergies reviewed.    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    Dyspnea on exertion 11/8/2010    Erectile dysfunction 11/8/2010    Essential hypertension 11/4/2015    Fatty liver 11/8/2010    Gastroesophageal reflux disease with esophagitis 5/27/2016    Gastroesophageal reflux disease without esophagitis 11/30/2015    GERD (gastroesophageal reflux disease) 11/8/2010    Heme positive stool 11/8/2010    Hyperglycemia 11/8/2010    Hyperlipidemia 11/8/2010    Hypertension 11/8/2010    Left ventricular hypertrophy 11/8/2010    Lumbar disc disease 1/3/2013    Lumbar disc herniation 11/8/2010    Lumbar facet arthropathy 1/3/2013    Lumbar radiculopathy 11/8/2010    Lumbar spinal stenosis 1/3/2013    Obesity 11/8/2010    Perennial allergic rhinitis 10/17/2016    Polycythemia     Retinal detachment 9/30/2013    Right eye - 2013    Testosterone deficiency 3/7/2013     Past

## 2024-03-04 ENCOUNTER — TELEPHONE (OUTPATIENT)
Dept: DERMATOLOGY | Age: 74
End: 2024-03-04

## 2024-03-04 NOTE — TELEPHONE ENCOUNTER
Left message advising patient that Dr Gutierrez is currently is clinic but that he could call back back sometime this week.     Asked him for more details regarding how his legs are doing.     Photos must be sent via [employer].

## 2024-03-04 NOTE — TELEPHONE ENCOUNTER
Pt calling states his legs have not gotten any better states he would like to speak to  directly about this pls return call back @ [phone] to discuss states he can send pics through a text not able to get [employer] to work

## 2024-03-04 NOTE — TELEPHONE ENCOUNTER
Advised patient that photos cannot be received through text only through [employer].     Patient scheduled for 3/6/24 at 1pm.

## 2024-03-04 NOTE — TELEPHONE ENCOUNTER
Pt calling back wants to send pictures through a text so  can see how his legs looks he stated he is not able to use [employer] pls return call back to discuss

## 2024-03-06 ENCOUNTER — OFFICE VISIT (OUTPATIENT)
Dept: DERMATOLOGY | Age: 74
End: 2024-03-06

## 2024-03-06 DIAGNOSIS — L30.9 CHRONIC DERMATITIS: Primary | ICD-10-CM

## 2024-03-06 DIAGNOSIS — A63.0 CONDYLOMA ACUMINATA: ICD-10-CM

## 2024-03-06 RX ORDER — TRIAMCINOLONE ACETONIDE 40 MG/ML
80 INJECTION, SUSPENSION INTRA-ARTICULAR; INTRAMUSCULAR ONCE
Status: COMPLETED | OUTPATIENT
Start: 2024-03-06 | End: 2024-03-06

## 2024-03-06 RX ADMIN — TRIAMCINOLONE ACETONIDE 80 MG: 40 INJECTION, SUSPENSION INTRA-ARTICULAR; INTRAMUSCULAR at 13:22

## 2024-03-06 NOTE — PROGRESS NOTES
Cry-Ac cryo spray gun.  Patient was educated regarding the potential risks of blister formation and discomfort.  Wound care was discussed.  The patient tolerated the procedure well and there were no immediate complications.          --Skyler Gutierrez MD

## 2024-03-18 ENCOUNTER — PATIENT MESSAGE (OUTPATIENT)
Dept: DERMATOLOGY | Age: 74
End: 2024-03-18

## 2024-03-19 NOTE — TELEPHONE ENCOUNTER
From: Oleg Sosa  To: Dr. Skyler Gutierrez  Sent: 3/18/2024 9:06 PM EDT  Subject: Rash on my legs    I showed you this because I am having surgery April 11 and can’t take any kind of steroids 2 weeks before. Just wondered if another shot would get on top of it before then. I can’t take any after the 28 th per Dr. Alberts and it would be at least 2 weeks before I see him again. Could you call me when you get a chance. Sorry to be a bother. Thanks ADELA

## 2024-03-19 NOTE — TELEPHONE ENCOUNTER
It would be too soon to get another shot because the medication from the most recent shot is still in his system.  He can try occluding his legs with saran wrap at night after putting on the steroid cream.

## 2024-04-09 RX ORDER — TRIAMCINOLONE ACETONIDE 1 MG/G
CREAM TOPICAL
Qty: 454 G | Refills: 7 | Status: SHIPPED | OUTPATIENT
Start: 2024-04-09

## 2024-05-15 ENCOUNTER — TELEPHONE (OUTPATIENT)
Dept: DERMATOLOGY | Age: 74
End: 2024-05-15

## 2024-05-15 RX ORDER — IMIQUIMOD 37.5 MG/G
CREAM TOPICAL
Qty: 7.5 G | Refills: 2 | Status: SHIPPED | OUTPATIENT
Start: 2024-05-15

## 2024-05-15 NOTE — TELEPHONE ENCOUNTER
[facility] calling to know how many pumps the pt should use when he takes his medicine [phone].

## 2024-05-15 NOTE — TELEPHONE ENCOUNTER
Pt calling for a refill on medication Imiquimod .5% pump and wants a couple of refills pls send to [facility] in [place] @ [phone] to discuss call pt back with any questions

## 2024-05-23 ENCOUNTER — OFFICE VISIT (OUTPATIENT)
Dept: DERMATOLOGY | Age: 74
End: 2024-05-23
Payer: MEDICARE

## 2024-05-23 DIAGNOSIS — L57.0 ACTINIC KERATOSIS: Primary | ICD-10-CM

## 2024-05-23 DIAGNOSIS — D48.5 NEOPLASM OF UNCERTAIN BEHAVIOR OF SKIN: ICD-10-CM

## 2024-05-23 DIAGNOSIS — A63.0 CONDYLOMA ACUMINATA: ICD-10-CM

## 2024-05-23 PROCEDURE — 11102 TANGNTL BX SKIN SINGLE LES: CPT | Performed by: DERMATOLOGY

## 2024-05-23 PROCEDURE — 17003 DESTRUCT PREMALG LES 2-14: CPT | Performed by: DERMATOLOGY

## 2024-05-23 PROCEDURE — 17000 DESTRUCT PREMALG LESION: CPT | Performed by: DERMATOLOGY

## 2024-05-23 PROCEDURE — 17111 DESTRUCTION B9 LESIONS 15/>: CPT | Performed by: DERMATOLOGY

## 2024-05-23 NOTE — PROGRESS NOTES
and discomfort.  Wound care was discussed.  The patient tolerated the procedure well and there were no immediate complications.     Resume imiquimod 3.75% cream nightly 3 nights per week (he preferred over the packets of imiquimod 5% cream).  Instructed to leave on the skin for 8 hours prior to washing off.           --Skyler Gutierrez MD

## 2024-05-30 ENCOUNTER — TELEPHONE (OUTPATIENT)
Dept: DERMATOLOGY | Age: 74
End: 2024-05-30

## 2024-06-21 ENCOUNTER — PROCEDURE VISIT (OUTPATIENT)
Dept: DERMATOLOGY | Age: 74
End: 2024-06-21

## 2024-06-21 DIAGNOSIS — A63.0 CONDYLOMA ACUMINATA: ICD-10-CM

## 2024-06-21 DIAGNOSIS — D48.5 NEOPLASM OF UNCERTAIN BEHAVIOR OF SKIN: Primary | ICD-10-CM

## 2024-06-21 NOTE — PATIENT INSTRUCTIONS
Surgical Wound Care Instructions    Sutured Wounds:    After your surgery, go home and take it easy.  Please refrain from any vigorous physical activity or heavy lifting for 14 days.    Leave the pressure bandage in place for 48 hours.  If it starts to detach, reinforce the bandage with another piece of tape.    Please keep the bandage dry for 48 hours.    After 48 hours, the wound can get wet.  Clean the area daily using mild soapy water.    Apply a thin layer of Aquaphor, Vaseline or petroleum jelly.    Apply a non stick gauze pad or bandage to the surgical wound daily and secure with medical tape for 14 days.      Complications:    If bleeding develops when you go home, apply pressure for 15 minutes continuously.  A small amount of ice in a bag covered with a towel may be used for another 10 minutes if necessary.  If the bleeding does not stop, please call your dermatologist.    Call your doctor if you have any of these signs of infection:     Skin around the wound is more red, swollen or feels hot    Wound smells bad    Pus drainage    Fever    Increasing pain

## 2024-06-21 NOTE — PROGRESS NOTES
[facility]at[facility]  Skyler Gutierrez MD  [phone]      Oleg Sosa  1950    73 y.o. male     Date of Visit: 6/21/2024    Chief Complaint: back lesion    History of Present Illness:    1.  Here today for excision of an atypical intradermal melanocytic proliferation of the right mid to lower back.    FINAL DIAGNOSIS:     Skin of right mid to lower back, shave biopsy:      - Atypical intradermal melanocytic proliferation extending to the        peripheral edge of the biopsy specimen.  See comment.   COMMENT:   Immunohistochemical stains are performed to better   characterize the lesion.  SOX10 highlights a proliferation of melanocytes   within the epidermis.  The melanocytes are arranged in single cells in a   lentiginous pattern with foci of confluent growth, but no well-developed   upward (pagetoid) migration.  The morphologic features and   immunohistochemical staining profile are consistent with the diagnosis of   an atypical intraepidermal melanocytic proliferation and concerning for   an early/evolving melanoma in situ.  Conservative excision for definitive   negative margins is recommended.     SKYEJA/ALONZO     2.  Follow up for numerous condyloma below the abdominal crease.        Review of Systems:  Gen: Feels well, good sense of health.  Heme: No abnormal bruising or bleeding.    Past Medical History, Family History, Surgical History, Medications and Allergies reviewed.    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

## 2024-08-21 ENCOUNTER — OFFICE VISIT (OUTPATIENT)
Dept: DERMATOLOGY | Age: 74
End: 2024-08-21
Payer: MEDICARE

## 2024-08-21 DIAGNOSIS — L57.0 ACTINIC KERATOSIS: ICD-10-CM

## 2024-08-21 DIAGNOSIS — D48.5 NEOPLASM OF UNCERTAIN BEHAVIOR OF SKIN: ICD-10-CM

## 2024-08-21 DIAGNOSIS — L82.0 INFLAMED SEBORRHEIC KERATOSIS: ICD-10-CM

## 2024-08-21 DIAGNOSIS — A63.0 CONDYLOMA ACUMINATA: Primary | ICD-10-CM

## 2024-08-21 PROCEDURE — 17000 DESTRUCT PREMALG LESION: CPT | Performed by: DERMATOLOGY

## 2024-08-21 PROCEDURE — 11102 TANGNTL BX SKIN SINGLE LES: CPT | Performed by: DERMATOLOGY

## 2024-08-21 PROCEDURE — 17003 DESTRUCT PREMALG LES 2-14: CPT | Performed by: DERMATOLOGY

## 2024-08-21 PROCEDURE — 17110 DESTRUCTION B9 LES UP TO 14: CPT | Performed by: DERMATOLOGY

## 2024-08-21 NOTE — PATIENT INSTRUCTIONS

## 2024-08-21 NOTE — PROGRESS NOTES
pink-brown papules.    4.  Left superior lateral leg with a 1.6 cm oval-shaped smooth pink patch.         Assessment and Plan     1. Condyloma acuminata -multiple lesions on the lower abdominal crease , improving with cryotherapy    Resume imiquimod cream 3 nights per week.  He is aware of associated application site reactions.    Cryotherapy was discussed and patient agreed to proceed.  Consent was obtained.   5 plaques were treated cryotherapy: lower abdominal crease. 2 cycles of liquid nitrogen applied to each lesion for 5 seconds using a Cry-Ac cryo spray gun.  Patient was educated regarding the potential risks of blister formation and discomfort.  Wound care was discussed.  The patient tolerated the procedure well and there were no immediate complications.      2. Actinic keratosis - 2    Cryotherapy was discussed and patient agreed to proceed.  Consent was obtained.  2 lesions were treated cryotherapy: crown of the scalp. 2 cycles of liquid nitrogen applied to each lesion for 5 seconds using a Cry-Ac cryo spray gun.  Patient was educated regarding the potential risks of blister formation and discomfort.  Wound care was discussed.  The patient tolerated the procedure well and there were no immediate complications.      3. Inflamed seborrheic keratosis     Cryotherapy was discussed and patient agreed to proceed.  Consent was obtained.  5 lesions were treated cryotherapy: Back-2, left upper arm-2, right popliteal fossa-1. 2 cycles of liquid nitrogen applied to each lesion for 5 seconds using a Cry-Ac cryo spray gun.  Patient was educated regarding the potential risks of blister formation and discomfort.  Wound care was discussed.  The patient tolerated the procedure well and there were no immediate complications.      4. Neoplasm of uncertain behavior of skin, left superior lateral leg-rule out BCC    Discussed possible diagnosis; patient agreeable to proceed with skin biopsy (written consent obtained).  Risks of

## 2024-08-30 ENCOUNTER — HOSPITAL ENCOUNTER (OUTPATIENT)
Dept: MRI IMAGING | Age: 74
Discharge: HOME | End: 2024-08-30
Attending: NEUROLOGICAL SURGERY
Payer: MEDICARE

## 2024-08-30 ENCOUNTER — PROCEDURE VISIT (OUTPATIENT)
Dept: DERMATOLOGY | Age: 74
End: 2024-08-30

## 2024-08-30 DIAGNOSIS — A63.0 CONDYLOMA ACUMINATA: ICD-10-CM

## 2024-08-30 DIAGNOSIS — M48.062 LUMBAR STENOSIS WITH NEUROGENIC CLAUDICATION: ICD-10-CM

## 2024-08-30 DIAGNOSIS — C44.719 BCC (BASAL CELL CARCINOMA), LEG, LEFT: Primary | ICD-10-CM

## 2024-08-30 PROCEDURE — 72148 MRI LUMBAR SPINE W/O DYE: CPT

## 2024-08-30 NOTE — PROGRESS NOTES
[facility] Dermat[facility]  Skyler Gutierrez MD  [phone]      Oleg Sosa  1950    73 y.o. male     Date of Visit: 8/30/2024    Chief Complaint: BCC    History of Present Illness:    1.  Here today for treatment of a superficial BCC on the left superior lateral leg.    FINAL DIAGNOSIS:     Skin, left superior lateral leg, shave biopsy:   - Superficial basal cell carcinoma.     COLCR/COLCR     2.  Also here for another treatment of the condyloma on the crease below the abdomen.     Review of Systems:  Gen: Feels well, good sense of health.      Past Medical History, Family History, Surgical History, Medications and Allergies reviewed.    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    Dyspnea on exertion 11/8/2010    Erectile dysfunction 11/8/2010    Essential hypertension 11/4/2015    Fatty liver 11/8/2010    Gastroesophageal reflux disease with esophagitis 5/27/2016    Gastroesophageal reflux disease without esophagitis 11/30/2015    GERD (gastroesophageal reflux disease) 11/8/2010    Heme positive stool 11/8/2010    Hyperglycemia 11/8/2010    Hyperlipidemia 11/8/2010    Hypertension 11/8/2010    Left ventricular hypertrophy 11/8/2010    Lumbar disc disease 1/3/2013    Lumbar disc herniation 11/8/2010    Lumbar facet arthropathy 1/3/2013    Lumbar radiculopathy 11/8/2010    Lumbar spinal stenosis 1/3/2013    Obesity 11/8/2010    Perennial allergic rhinitis 10/17/2016    Polycythemia     Retinal detachment 9/30/2013    Right eye - 2013    Testosterone deficiency 3/7/2013     Past Surgical History:   Procedure Laterality Date    BACK SURGERY  07/01/2017    BACK SURGERY

## 2024-10-24 ENCOUNTER — OFFICE VISIT (OUTPATIENT)
Dept: DERMATOLOGY | Age: 74
End: 2024-10-24
Payer: MEDICARE

## 2024-10-24 DIAGNOSIS — L82.0 INFLAMED SEBORRHEIC KERATOSIS: Primary | ICD-10-CM

## 2024-10-24 DIAGNOSIS — A63.0 CONDYLOMA ACUMINATA: ICD-10-CM

## 2024-10-24 PROCEDURE — 17111 DESTRUCTION B9 LESIONS 15/>: CPT | Performed by: DERMATOLOGY

## 2024-10-24 NOTE — PROGRESS NOTES
[facility]ermat[facility]  Skyler Gutierrez MD  [phone]      Oleg Sosa  1950    74 y.o. male     Date of Visit: 10/24/2024    Chief Complaint: skin lesions    History of Present Illness:    1.  He presents today for pruritic lesions on the back.     2.  He also returns for condyloma acuminata of the lower abdominal crease - improving with cryotherapy and imiquimod cream.     Derm Hx:     Superficial BCC of the left superior lateral leg - treated with curettage on 8/30/24.     Atypical intradermal melanocytic proliferation on the right mid to lower back-excised on 6/21/2024.  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 Villela on 7/12/2022.     He has a history of a superficial BCC on the right mid posterior lateral thigh status post curettage and a half 2018.     He has a history of mildly dysplastic nevus on the left upper back status post removal with biopsy on 11/12/2020.      Review of Systems:  Gen: Feels well, good sense of health.    Past Medical History, Family History, Surgical History, Medications and Allergies reviewed.    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

## 2024-11-21 ENCOUNTER — OFFICE VISIT (OUTPATIENT)
Dept: DERMATOLOGY | Age: 74
End: 2024-11-21
Payer: MEDICARE

## 2024-11-21 DIAGNOSIS — A63.0 CONDYLOMA ACUMINATA: ICD-10-CM

## 2024-11-21 DIAGNOSIS — L57.0 ACTINIC KERATOSIS: Primary | ICD-10-CM

## 2024-11-21 PROCEDURE — 17003 DESTRUCT PREMALG LES 2-14: CPT | Performed by: DERMATOLOGY

## 2024-11-21 PROCEDURE — 17000 DESTRUCT PREMALG LESION: CPT | Performed by: DERMATOLOGY

## 2024-11-21 PROCEDURE — 17110 DESTRUCTION B9 LES UP TO 14: CPT | Performed by: DERMATOLOGY

## 2024-11-21 NOTE — PROGRESS NOTES
for 2 weeks or until improved. 240 g 1    mupirocin (BACTROBAN) 2 % ointment Apply to the lesion on the left leg 3 times daily for 10 days. 22 g 1    pantoprazole (PROTONIX) 40 MG tablet Take 1 tablet by mouth daily      calcipotriene (DOVONEX) 0.005 % cream Apply to the legs twice daily for psoriasis. 60 g 2    mometasone (ELOCON) 0.1 % ointment Apply to affected to the lesions on the shins twice daily for up 2 weeks or until improved. 45 g 2    carvedilol (COREG) 12.5 MG tablet Take 1 tablet by mouth 2 times daily 180 tablet 3    montelukast (SINGULAIR) 10 MG tablet Take 1 tablet by mouth nightly 90 tablet 3    methocarbamol (ROBAXIN-750) 750 MG tablet Take 1 tablet by mouth 2 times daily 180 tablet 3    guaiFENesin (MUCINEX) 600 MG extended release tablet Take 1 tablet by mouth 2 times daily 20 tablet 1    Hypodermic Needles-Disposable (SAFETY-AB NEEDLE 18G) MISC Use once a month to administer testosterone 100 each 0    Syringe/Needle, Disp, (SYRINGE 3CC/22GX1\") 22G X 1\" 3 ML MISC Use once a month to administer testosterone 100 each 0    docusate sodium (COLACE) 100 MG capsule Take 1 capsule by mouth 2 times daily      aspirin EC 81 MG EC tablet Take 1 tablet by mouth daily with food.      Multiple Vitamin (MULTIVITAMIN) capsule Take 1 capsule by mouth daily      cycloSPORINE (RESTASIS) 0.05 % ophthalmic emulsion Place 1 drop into both eyes 2 times daily           Physical Examination       Full skin exam except for the skin below the underwear.     Well appearing.    1.  Crown of the scalp - 6, left frontal scalp - 2: ill defined keratotic pink macules.     2.  Right side of the lower abdominal crease - 10, left side of the lower abdominal crease - 2: multiple verrucous pink papules.         Assessment and Plan     1. Actinic keratoses - 8    Cryotherapy was discussed and patient agreed to proceed.  Consent was obtained.  8 lesions were treated cryotherapy: Crown of the scalp - 6, left frontal scalp - 2. 2

## 2025-01-21 ENCOUNTER — OFFICE VISIT (OUTPATIENT)
Age: 75
End: 2025-01-21
Payer: MEDICARE

## 2025-01-21 DIAGNOSIS — L57.0 ACTINIC KERATOSIS: ICD-10-CM

## 2025-01-21 DIAGNOSIS — L82.0 INFLAMED SEBORRHEIC KERATOSIS: ICD-10-CM

## 2025-01-21 DIAGNOSIS — A63.0 CONDYLOMA ACUMINATA: Primary | ICD-10-CM

## 2025-01-21 PROCEDURE — 17003 DESTRUCT PREMALG LES 2-14: CPT | Performed by: DERMATOLOGY

## 2025-01-21 PROCEDURE — 17111 DESTRUCTION B9 LESIONS 15/>: CPT | Performed by: DERMATOLOGY

## 2025-01-21 PROCEDURE — 17000 DESTRUCT PREMALG LESION: CPT | Performed by: DERMATOLOGY

## 2025-01-21 NOTE — PROGRESS NOTES
[facility] Dermat[facility]  Skyler Gutierrez MD  [phone]      Oleg Sosa  1950    74 y.o. male     Date of Visit: 1/21/2025    Chief Complaint: skin lesions    History of Present Illness:    1.  Follow up for condyloma acuminata - improving with cryotherapy.  Here for additional treatment.     2.  He reports several intensely pruritic lesions on the trunk.     3.  He reports few scaly lesions on the scalp.      Review of Systems:  Gen: Feels well, good sense of health.    Past Medical History, Family History, Surgical History, Medications and Allergies reviewed.    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    Dyspnea on exertion 11/8/2010    Erectile dysfunction 11/8/2010    Essential hypertension 11/4/2015    Fatty liver 11/8/2010    Gastroesophageal reflux disease with esophagitis 5/27/2016    Gastroesophageal reflux disease without esophagitis 11/30/2015    GERD (gastroesophageal reflux disease) 11/8/2010    Heme positive stool 11/8/2010    Hyperglycemia 11/8/2010    Hyperlipidemia 11/8/2010    Hypertension 11/8/2010    Left ventricular hypertrophy 11/8/2010    Lumbar disc disease 1/3/2013    Lumbar disc herniation 11/8/2010    Lumbar facet arthropathy 1/3/2013    Lumbar radiculopathy 11/8/2010    Lumbar spinal stenosis 1/3/2013    Obesity 11/8/2010    Perennial allergic rhinitis 10/17/2016    Polycythemia     Retinal detachment 9/30/2013    Right eye - 2013    Testosterone deficiency 3/7/2013     Past Surgical History:   Procedure Laterality Date    BACK SURGERY  07/01/2017    BACK SURGERY  04/2024    CATARACT REMOVAL WITH IMPLANT Left 11/11/2008    Dr. Tyrone Santana

## 2025-03-06 ENCOUNTER — OFFICE VISIT (OUTPATIENT)
Age: 75
End: 2025-03-06
Payer: MEDICARE

## 2025-03-06 DIAGNOSIS — L57.0 ACTINIC KERATOSIS: ICD-10-CM

## 2025-03-06 DIAGNOSIS — A63.0 CONDYLOMA ACUMINATA: Primary | ICD-10-CM

## 2025-03-06 DIAGNOSIS — L82.0 INFLAMED SEBORRHEIC KERATOSIS: ICD-10-CM

## 2025-03-06 PROCEDURE — 17003 DESTRUCT PREMALG LES 2-14: CPT | Performed by: DERMATOLOGY

## 2025-03-06 PROCEDURE — 17111 DESTRUCTION B9 LESIONS 15/>: CPT | Performed by: DERMATOLOGY

## 2025-03-06 PROCEDURE — 17000 DESTRUCT PREMALG LESION: CPT | Performed by: DERMATOLOGY

## 2025-03-06 NOTE — PROGRESS NOTES
left lateral inguinal crease - 1. 2 cycles of liquid nitrogen applied to each lesion for 5 seconds using a Cry-Ac cryo spray gun.  Patient was educated regarding the potential risks of blister formation and discomfort.  Wound care was discussed.  The patient tolerated the procedure well and there were no immediate complications.      2. Actinic keratosis - 3    Cryotherapy was discussed and patient agreed to proceed.  Consent was obtained.  3 lesions were treated cryotherapy: left superior parietal scalp. 2 cycles of liquid nitrogen applied to each lesion for 5 seconds using a Cry-Ac cryo spray gun.  Patient was educated regarding the potential risks of blister formation and discomfort.  Wound care was discussed.  The patient tolerated the procedure well and there were no immediate complications.      3. Inflamed seborrheic keratosis - few    Cryotherapy was discussed and patient agreed to proceed.  Consent was obtained.  4 lesions were treated cryotherapy: Back - 3, left forearm - 1. 2 cycles of liquid nitrogen applied to each lesion for 5 seconds using a Cry-Ac cryo spray gun.  Patient was educated regarding the potential risks of blister formation and discomfort.  Wound care was discussed.  The patient tolerated the procedure well and there were no immediate complications.          Return in about 6 weeks (around 4/17/2025).    --Skyler Gutierrez MD

## 2025-03-10 ENCOUNTER — TELEPHONE (OUTPATIENT)
Age: 75
End: 2025-03-10

## 2025-03-10 NOTE — TELEPHONE ENCOUNTER
Pt needs refill of calcipotriene (DOVONEX) 0.005 % cream sent to [facility] in [facility] in [place], [place].    [phone]

## 2025-03-11 RX ORDER — CALCIPOTRIENE 50 UG/G
CREAM TOPICAL
Qty: 60 G | Refills: 2 | Status: SHIPPED | OUTPATIENT
Start: 2025-03-11

## 2025-03-18 ENCOUNTER — TRANSCRIBE ORDERS (OUTPATIENT)
Dept: ADMINISTRATIVE | Age: 75
End: 2025-03-18

## 2025-03-18 DIAGNOSIS — M48.062 LUMBAR STENOSIS WITH NEUROGENIC CLAUDICATION: Primary | ICD-10-CM

## 2025-04-02 ENCOUNTER — HOSPITAL ENCOUNTER (OUTPATIENT)
Dept: MRI IMAGING | Age: 75
Discharge: HOME | End: 2025-04-02
Attending: NEUROLOGICAL SURGERY
Payer: MEDICARE

## 2025-04-02 DIAGNOSIS — M48.062 LUMBAR STENOSIS WITH NEUROGENIC CLAUDICATION: ICD-10-CM

## 2025-04-02 PROCEDURE — 72148 MRI LUMBAR SPINE W/O DYE: CPT

## 2025-04-17 ENCOUNTER — OFFICE VISIT (OUTPATIENT)
Age: 75
End: 2025-04-17
Payer: MEDICARE

## 2025-04-17 DIAGNOSIS — L82.0 INFLAMED SEBORRHEIC KERATOSIS: ICD-10-CM

## 2025-04-17 DIAGNOSIS — A63.0 CONDYLOMA ACUMINATA: Primary | ICD-10-CM

## 2025-04-17 PROCEDURE — 17111 DESTRUCTION B9 LESIONS 15/>: CPT | Performed by: DERMATOLOGY

## 2025-04-17 NOTE — PROGRESS NOTES
[facility]  Skyler Gutierrez MD  [phone]      Oleg Sosa  1950    74 y.o. adult     Date of Visit: 4/17/2025    Chief Complaint: warts and itchy skin lesions    History of Present Illness:    1.  He presents today for reevaluation and treatment of condyloma acuminata occurring in the skin creases below the abdomen.    2.  He also has a history of multiple inflamed seborrheic keratoses and reports few new lesions on the back today that are intensely pruritic.      Review of Systems:  Gen: Feels well, good sense of health.    Past Medical History, Family History, Surgical History, Medications and Allergies reviewed.    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    Dyspnea on exertion 11/8/2010    Erectile dysfunction 11/8/2010    Essential hypertension 11/4/2015    Fatty liver 11/8/2010    Gastroesophageal reflux disease with esophagitis 5/27/2016    Gastroesophageal reflux disease without esophagitis 11/30/2015    GERD (gastroesophageal reflux disease) 11/8/2010    Heme positive stool 11/8/2010    Hyperglycemia 11/8/2010    Hyperlipidemia 11/8/2010    Hypertension 11/8/2010    Left ventricular hypertrophy 11/8/2010    Lumbar disc disease 1/3/2013    Lumbar disc herniation 11/8/2010    Lumbar facet arthropathy 1/3/2013    Lumbar radiculopathy 11/8/2010    Lumbar spinal stenosis 1/3/2013    Obesity 11/8/2010    Perennial allergic rhinitis 10/17/2016    Polycythemia     Retinal detachment 9/30/2013    Right eye - 2013    Testosterone deficiency 3/7/2013     Past Surgical History:   Procedure Laterality Date    BACK SURGERY  07/01/2017    BACK SURGERY  04/2024

## 2025-04-29 RX ORDER — TRIAMCINOLONE ACETONIDE 1 MG/G
CREAM TOPICAL
Qty: 454 G | Refills: 7 | Status: SHIPPED | OUTPATIENT
Start: 2025-04-29

## 2025-06-02 ENCOUNTER — OFFICE VISIT (OUTPATIENT)
Age: 75
End: 2025-06-02
Payer: MEDICARE

## 2025-06-02 DIAGNOSIS — L81.4 SOLAR LENTIGINOSIS: ICD-10-CM

## 2025-06-02 DIAGNOSIS — L82.0 INFLAMED SEBORRHEIC KERATOSIS: ICD-10-CM

## 2025-06-02 DIAGNOSIS — L97.911 TRAUMATIC LEG ULCER, RIGHT, LIMITED TO BREAKDOWN OF SKIN (HCC): ICD-10-CM

## 2025-06-02 DIAGNOSIS — L57.0 ACTINIC KERATOSIS: Primary | ICD-10-CM

## 2025-06-02 DIAGNOSIS — L40.9 PSORIASIS: ICD-10-CM

## 2025-06-02 PROCEDURE — 17000 DESTRUCT PREMALG LESION: CPT | Performed by: DERMATOLOGY

## 2025-06-02 PROCEDURE — 1123F ACP DISCUSS/DSCN MKR DOCD: CPT | Performed by: DERMATOLOGY

## 2025-06-02 PROCEDURE — 1159F MED LIST DOCD IN RCRD: CPT | Performed by: DERMATOLOGY

## 2025-06-02 PROCEDURE — 99212 OFFICE O/P EST SF 10 MIN: CPT | Performed by: DERMATOLOGY

## 2025-06-02 PROCEDURE — 17003 DESTRUCT PREMALG LES 2-14: CPT | Performed by: DERMATOLOGY

## 2025-06-02 PROCEDURE — G8427 DOCREV CUR MEDS BY ELIG CLIN: HCPCS | Performed by: DERMATOLOGY

## 2025-06-02 PROCEDURE — 1160F RVW MEDS BY RX/DR IN RCRD: CPT | Performed by: DERMATOLOGY

## 2025-06-02 PROCEDURE — 3017F COLORECTAL CA SCREEN DOC REV: CPT | Performed by: DERMATOLOGY

## 2025-06-02 PROCEDURE — 17110 DESTRUCTION B9 LES UP TO 14: CPT | Performed by: DERMATOLOGY

## 2025-06-02 PROCEDURE — G8421 BMI NOT CALCULATED: HCPCS | Performed by: DERMATOLOGY

## 2025-06-02 PROCEDURE — 1036F TOBACCO NON-USER: CPT | Performed by: DERMATOLOGY

## 2025-06-02 PROCEDURE — 99213 OFFICE O/P EST LOW 20 MIN: CPT | Performed by: DERMATOLOGY

## 2025-06-02 NOTE — PROGRESS NOTES
[facility]  Skyler Gutierrez MD  [phone]      Oleg Sosa  1950    74 y.o. adult     Date of Visit: 6/2/2025    Chief Complaint: skin lesions of concern    History of Present Illness:    1.  He reports several persistent scaly lesions on the scalp and face.     2.  He also reports few persistent intensely pruritic lesions on the back.     3.  He's also here for evaluation of pigmented lesions on the trunk and extremities.     4.  Recently traumatized skin on the right shin.     5.  He has a history of psoriasis - doing OK with the following:     Dovonex cream and clobetasol cream    Derm Hx:     Atypical intradermal melanocytic proliferation on the right mid to lower back-excised on 6/21/2024.  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 Villela on 7/12/2022.      Superficial basal cell carcinoma on the left superior lateral leg-treated with curettage on 8/30/2024.  He has a history of a superficial BCC on the right mid posterior lateral thigh status post curettage and a half 2018.     He has a history of mildly dysplastic nevus on the left upper back status post removal with biopsy on 11/12/2020.      Review of Systems:  Gen: Feels well, good sense of health.    Past Medical History, Family History, Surgical History, Medications and Allergies reviewed.    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

## 2025-07-01 ENCOUNTER — TELEPHONE (OUTPATIENT)
Age: 75
End: 2025-07-01

## 2025-07-03 ENCOUNTER — OFFICE VISIT (OUTPATIENT)
Age: 75
End: 2025-07-03
Payer: MEDICARE

## 2025-07-03 DIAGNOSIS — A63.0 CONDYLOMA ACUMINATA: Primary | ICD-10-CM

## 2025-07-03 PROCEDURE — 17111 DESTRUCTION B9 LESIONS 15/>: CPT | Performed by: DERMATOLOGY

## 2025-07-03 NOTE — PROGRESS NOTES
[facility]  Skyler Gutierrez MD  [phone]      Oleg Sosa  1950    74 y.o. adult     Date of Visit: 7/3/2025    Chief Complaint: skin lesions    History of Present Illness:    He reports a recurrence of condyloma on the lower abdominal crease.  Here today for treatment.       Review of Systems:  Gen: Feels well, good sense of health.    Past Medical History, Family History, Surgical History, Medications and Allergies reviewed.    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    Dyspnea on exertion 11/8/2010    Erectile dysfunction 11/8/2010    Essential hypertension 11/4/2015    Fatty liver 11/8/2010    Gastroesophageal reflux disease with esophagitis 5/27/2016    Gastroesophageal reflux disease without esophagitis 11/30/2015    GERD (gastroesophageal reflux disease) 11/8/2010    Heme positive stool 11/8/2010    Hyperglycemia 11/8/2010    Hyperlipidemia 11/8/2010    Hypertension 11/8/2010    Left ventricular hypertrophy 11/8/2010    Lumbar disc disease 1/3/2013    Lumbar disc herniation 11/8/2010    Lumbar facet arthropathy 1/3/2013    Lumbar radiculopathy 11/8/2010    Lumbar spinal stenosis 1/3/2013    Obesity 11/8/2010    Perennial allergic rhinitis 10/17/2016    Polycythemia     Retinal detachment 9/30/2013    Right eye - 2013    Testosterone deficiency 3/7/2013     Past Surgical History:   Procedure Laterality Date    BACK SURGERY  07/01/2017    BACK SURGERY  04/2024    CATARACT REMOVAL WITH IMPLANT Left 11/11/2008    Dr. Tyrone Santana    COLONOSCOPY  12/01/2010    Dr. Jose Daniel Britton    COLONOSCOPY  02/13/2017    [facility]    DENTAL SURGERY

## 2025-08-05 ENCOUNTER — OFFICE VISIT (OUTPATIENT)
Age: 75
End: 2025-08-05
Payer: MEDICARE

## 2025-08-05 DIAGNOSIS — L82.1 SK (SEBORRHEIC KERATOSIS): Primary | ICD-10-CM

## 2025-08-05 DIAGNOSIS — L57.0 ACTINIC KERATOSIS: ICD-10-CM

## 2025-08-05 DIAGNOSIS — A63.0 CONDYLOMA ACUMINATA: ICD-10-CM

## 2025-08-05 PROCEDURE — 17003 DESTRUCT PREMALG LES 2-14: CPT | Performed by: DERMATOLOGY

## 2025-08-05 PROCEDURE — 3017F COLORECTAL CA SCREEN DOC REV: CPT | Performed by: DERMATOLOGY

## 2025-08-05 PROCEDURE — 99212 OFFICE O/P EST SF 10 MIN: CPT | Performed by: DERMATOLOGY

## 2025-08-05 PROCEDURE — 99211 OFF/OP EST MAY X REQ PHY/QHP: CPT | Performed by: DERMATOLOGY

## 2025-08-05 PROCEDURE — 17111 DESTRUCTION B9 LESIONS 15/>: CPT | Performed by: DERMATOLOGY

## 2025-08-05 PROCEDURE — G8427 DOCREV CUR MEDS BY ELIG CLIN: HCPCS | Performed by: DERMATOLOGY

## 2025-08-05 PROCEDURE — G8421 BMI NOT CALCULATED: HCPCS | Performed by: DERMATOLOGY

## 2025-08-05 PROCEDURE — 1123F ACP DISCUSS/DSCN MKR DOCD: CPT | Performed by: DERMATOLOGY

## 2025-08-05 PROCEDURE — 1159F MED LIST DOCD IN RCRD: CPT | Performed by: DERMATOLOGY

## 2025-08-05 PROCEDURE — 1036F TOBACCO NON-USER: CPT | Performed by: DERMATOLOGY

## 2025-08-05 PROCEDURE — 17000 DESTRUCT PREMALG LESION: CPT | Performed by: DERMATOLOGY
